# Patient Record
Sex: FEMALE | Race: BLACK OR AFRICAN AMERICAN | NOT HISPANIC OR LATINO | Employment: FULL TIME | URBAN - METROPOLITAN AREA
[De-identification: names, ages, dates, MRNs, and addresses within clinical notes are randomized per-mention and may not be internally consistent; named-entity substitution may affect disease eponyms.]

---

## 2017-06-13 ENCOUNTER — HOSPITAL ENCOUNTER (EMERGENCY)
Facility: HOSPITAL | Age: 37
Discharge: HOME | End: 2017-06-13
Attending: FAMILY MEDICINE
Payer: MEDICAID

## 2017-06-13 VITALS
OXYGEN SATURATION: 100 % | HEART RATE: 63 BPM | TEMPERATURE: 98 F | HEIGHT: 65 IN | RESPIRATION RATE: 17 BRPM | SYSTOLIC BLOOD PRESSURE: 134 MMHG | DIASTOLIC BLOOD PRESSURE: 80 MMHG

## 2017-06-13 DIAGNOSIS — R53.1 WEAKNESS: Primary | ICD-10-CM

## 2017-06-13 DIAGNOSIS — R55 SYNCOPE: ICD-10-CM

## 2017-06-13 DIAGNOSIS — D64.9 ANEMIA, UNSPECIFIED TYPE: ICD-10-CM

## 2017-06-13 DIAGNOSIS — R07.89 CHEST WALL PAIN: ICD-10-CM

## 2017-06-13 DIAGNOSIS — R07.89 LEFT-SIDED CHEST WALL PAIN: ICD-10-CM

## 2017-06-13 LAB
ALBUMIN SERPL BCP-MCNC: 5.3 G/DL
ALP SERPL-CCNC: 71 IU/L
ALT SERPL W/O P-5'-P-CCNC: 26 IU/L
ANION GAP SERPL CALC-SCNC: 17 MMOL/L
ANISOCYTOSIS BLD QL SMEAR: SLIGHT
APTT BLDCRRT: 21.8 SEC
AST SERPL-CCNC: 34 IU/L
B-HCG UR QL: NEGATIVE
BASOPHILS # BLD AUTO: 0.02 K/UL
BASOPHILS NFR BLD: 0.4 %
BILIRUB SERPL-MCNC: 0.9 MG/DL
BILIRUB UR QL STRIP: NEGATIVE
BUN SERPL-MCNC: 10 MG/DL
CALCIUM SERPL-MCNC: 9.4 MG/DL
CHLORIDE SERPL-SCNC: 106 MMOL/L
CLARITY UR REFRACT.AUTO: CLEAR
CO2 SERPL-SCNC: 21 MMOL/L
COLOR UR AUTO: ABNORMAL
CREAT SERPL-MCNC: 0.76 MG/DL
DIFFERENTIAL METHOD: ABNORMAL
EOSINOPHIL # BLD AUTO: 0.1 K/UL
EOSINOPHIL NFR BLD: 1.5 %
ERYTHROCYTE [DISTWIDTH] IN BLOOD BY AUTOMATED COUNT: 18.6 %
EST. GFR  (AFRICAN AMERICAN): >60 ML/MIN/1.73 M^2
EST. GFR  (NON AFRICAN AMERICAN): >60 ML/MIN/1.73 M^2
GLUCOSE SERPL-MCNC: 80 MG/DL
GLUCOSE UR QL STRIP: NEGATIVE
HCT VFR BLD AUTO: 29.4 %
HGB BLD-MCNC: 9 G/DL
HGB UR QL STRIP: ABNORMAL
HYPOCHROMIA BLD QL SMEAR: ABNORMAL
INR PPP: 1.2
KETONES UR QL STRIP: NEGATIVE
LEUKOCYTE ESTERASE UR QL STRIP: NEGATIVE
LYMPHOCYTES # BLD AUTO: 1.6 K/UL
LYMPHOCYTES NFR BLD: 29.8 %
MCH RBC QN AUTO: 21.5 PG
MCHC RBC AUTO-ENTMCNC: 30.6 %
MCV RBC AUTO: 70 FL
MONOCYTES # BLD AUTO: 0.6 K/UL
MONOCYTES NFR BLD: 12.1 %
NEUTROPHILS # BLD AUTO: 2.9 K/UL
NEUTROPHILS NFR BLD: 56.2 %
NITRITE UR QL STRIP: NEGATIVE
PH UR STRIP: 6 [PH] (ref 5–8)
PLATELET # BLD AUTO: 314 K/UL
PMV BLD AUTO: 9.9 FL
POCT GLUCOSE: 100 MG/DL (ref 70–110)
POTASSIUM SERPL-SCNC: 3.4 MMOL/L
PROT SERPL-MCNC: 9.9 G/DL
PROT UR QL STRIP: ABNORMAL
PROTHROMBIN TIME: 13.1 SEC
RBC # BLD AUTO: 4.18 M/UL
SODIUM SERPL-SCNC: 144 MMOL/L
SP GR UR STRIP: 1.02 (ref 1–1.03)
TROPONIN I SERPL DL<=0.01 NG/ML-MCNC: <0.012 NG/ML
URN SPEC COLLECT METH UR: ABNORMAL
UROBILINOGEN UR STRIP-ACNC: 1 EU/DL
WBC # BLD AUTO: 5.21 K/UL

## 2017-06-13 PROCEDURE — 85025 COMPLETE CBC W/AUTO DIFF WBC: CPT

## 2017-06-13 PROCEDURE — 99284 EMERGENCY DEPT VISIT MOD MDM: CPT | Mod: 25

## 2017-06-13 PROCEDURE — 81003 URINALYSIS AUTO W/O SCOPE: CPT

## 2017-06-13 PROCEDURE — 84484 ASSAY OF TROPONIN QUANT: CPT

## 2017-06-13 PROCEDURE — 82962 GLUCOSE BLOOD TEST: CPT

## 2017-06-13 PROCEDURE — 85730 THROMBOPLASTIN TIME PARTIAL: CPT

## 2017-06-13 PROCEDURE — 81025 URINE PREGNANCY TEST: CPT

## 2017-06-13 PROCEDURE — 25000003 PHARM REV CODE 250: Performed by: FAMILY MEDICINE

## 2017-06-13 PROCEDURE — 80053 COMPREHEN METABOLIC PANEL: CPT

## 2017-06-13 PROCEDURE — 93005 ELECTROCARDIOGRAM TRACING: CPT

## 2017-06-13 PROCEDURE — 85610 PROTHROMBIN TIME: CPT

## 2017-06-13 RX ORDER — KETOROLAC TROMETHAMINE 10 MG/1
10 TABLET, FILM COATED ORAL
Status: COMPLETED | OUTPATIENT
Start: 2017-06-13 | End: 2017-06-13

## 2017-06-13 RX ORDER — IBUPROFEN 800 MG/1
800 TABLET ORAL 3 TIMES DAILY PRN
Qty: 30 TABLET | Refills: 0 | Status: SHIPPED | OUTPATIENT
Start: 2017-06-13 | End: 2018-01-26

## 2017-06-13 RX ADMIN — KETOROLAC TROMETHAMINE 10 MG: 10 TABLET, FILM COATED ORAL at 02:06

## 2017-06-13 NOTE — ED TRIAGE NOTES
PT reports she passed out at work a few minutes ago. Pt reports she did not fall on ground bc employees caught her. Pt also reports SOB and chest pain that started last night

## 2017-06-13 NOTE — ED PROVIDER NOTES
Encounter Date: 2017       History     Chief Complaint   Patient presents with    Loss of Consciousness     PT reports she passed out at work a few minutes ago. Pt reports she did not fall on ground bc employees caught her. Pt also reports SOB and chest pain that started last night     Review of patient's allergies indicates:  No Known Allergies  Patient says she felt to see and suddenly was about to fall down when her coworker helped her not to fall.  Since then patient is having some dizziness.  Patient also complains of left chest wall pain.  No shortness of breath no cough no fever.  No abdominal pain.      The history is provided by the patient.     Past Medical History:   Diagnosis Date    Hypertension      Past Surgical History:   Procedure Laterality Date     SECTION      x 3    TONSILLECTOMY       History reviewed. No pertinent family history.  Social History   Substance Use Topics    Smoking status: Current Every Day Smoker     Packs/day: 0.50     Types: Cigarettes    Smokeless tobacco: Not on file    Alcohol use Yes      Comment: rarely     Review of Systems   Constitutional: Negative for activity change, appetite change, chills, diaphoresis, fatigue and fever.   HENT: Negative for congestion, ear pain, sore throat and tinnitus.    Respiratory: Negative for cough, shortness of breath and wheezing.    Gastrointestinal: Negative for abdominal pain, diarrhea, nausea and vomiting.   Genitourinary: Negative for difficulty urinating, dysuria and frequency.   Musculoskeletal: Negative for back pain and neck pain.   Skin: Negative for rash.   Neurological: Positive for dizziness and syncope. Negative for tremors, seizures, facial asymmetry, speech difficulty, weakness, light-headedness, numbness and headaches.   All other systems reviewed and are negative.      Physical Exam     Initial Vitals [17 1210]   BP Pulse Resp Temp SpO2   133/77 87 20 98.5 °F (36.9 °C) 99 %     Physical  Exam    Nursing note and vitals reviewed.  Constitutional: She appears well-developed and well-nourished.   HENT:   Head: Normocephalic and atraumatic.   Right Ear: External ear normal.   Left Ear: External ear normal.   Nose: Nose normal.   Mouth/Throat: Oropharynx is clear and moist.   Eyes: Conjunctivae and EOM are normal. Pupils are equal, round, and reactive to light.   Neck: Normal range of motion. Neck supple.   Cardiovascular: Normal rate, regular rhythm, normal heart sounds and intact distal pulses. Exam reveals no gallop and no friction rub.    No murmur heard.  Pulmonary/Chest: Breath sounds normal. No respiratory distress. She has no wheezes. She has no rales. She exhibits no tenderness.   Abdominal: Soft. Bowel sounds are normal. She exhibits no distension and no mass. There is no tenderness. There is no guarding.   Musculoskeletal: Normal range of motion.   Neurological: She is alert and oriented to person, place, and time. She has normal strength and normal reflexes.   Skin: Skin is warm. Capillary refill takes less than 2 seconds.   Psychiatric: She has a normal mood and affect. Thought content normal.         ED Course   Procedures  Labs Reviewed   URINALYSIS - Abnormal; Notable for the following:        Result Value    Protein, UA Trace (*)     Occult Blood UA Trace (*)     All other components within normal limits   COMPREHENSIVE METABOLIC PANEL - Abnormal; Notable for the following:     Potassium 3.4 (*)     CO2 21 (*)     Total Protein 9.9 (*)     Albumin 5.3 (*)     Anion Gap 17 (*)     All other components within normal limits   PROTIME-INR - Abnormal; Notable for the following:     Prothrombin Time 13.1 (*)     All other components within normal limits   CBC W/ AUTO DIFFERENTIAL - Abnormal; Notable for the following:     Hemoglobin 9.0 (*)     Hematocrit 29.4 (*)     MCV 70 (*)     MCH 21.5 (*)     MCHC 30.6 (*)     RDW 18.6 (*)     All other components within normal limits   PREGNANCY TEST,  URINE RAPID   APTT   TROPONIN I   POCT GLUCOSE        ECG Results          EKG 12-lead (Final result)  Result time 06/13/17 14:26:32    Final result by Interface, Lab In [facility] (06/13/17 14:26:32)                 Narrative:    Test Reason : R55  Blood Pressure :  mmHG  Vent. Rate : 081 BPM     Atrial Rate : 081 BPM     P-R Int : 144 ms          QRS Dur : 092 ms      QT Int : 380 ms       P-R-T Axes : 078 073 067 degrees     QTc Int : 441 ms    Normal sinus rhythm  Normal ECG    Confirmed by Perri Navas MD ([id number]) on 6/13/2017 2:26:23 PM    Referred By: KAYLA   SELF           Confirmed By:Perri Navas MD                                                 ED Course     Clinical Impression:   The primary encounter diagnosis was Weakness. Diagnoses of Syncope, Left-sided chest wall pain, Chest wall pain, and Anemia, unspecified type were also pertinent to this visit.    Disposition:   Disposition: Discharged  Condition: Stable       Rj Rodriguez MD  06/19/17 0424

## 2017-06-28 ENCOUNTER — LAB VISIT (OUTPATIENT)
Dept: LAB | Facility: HOSPITAL | Age: 37
End: 2017-06-28
Attending: NURSE PRACTITIONER
Payer: MEDICAID

## 2017-06-28 DIAGNOSIS — E55.9 UNSPECIFIED VITAMIN D DEFICIENCY: ICD-10-CM

## 2017-06-28 DIAGNOSIS — R63.4 WEIGHT LOSS: ICD-10-CM

## 2017-06-28 DIAGNOSIS — D64.9 ANEMIA, UNSPECIFIED: Primary | ICD-10-CM

## 2017-06-28 LAB
25(OH)D3+25(OH)D2 SERPL-MCNC: 13 NG/ML
ERYTHROCYTE [SEDIMENTATION RATE] IN BLOOD BY WESTERGREN METHOD: 10 MM/HR
FERRITIN SERPL-MCNC: 5 NG/ML
FOLATE SERPL-MCNC: 9.8 NG/ML
IRON SERPL-MCNC: 14 UG/DL
RETICS/RBC NFR AUTO: 0.8 %
SATURATED IRON: 3 %
T3FREE SERPL-MCNC: 2.6 PG/ML
T4 SERPL-MCNC: 6.3 UG/DL
TOTAL IRON BINDING CAPACITY: 478 UG/DL
TRANSFERRIN SERPL-MCNC: 323 MG/DL
TSH SERPL DL<=0.005 MIU/L-ACNC: 1.08 UIU/ML
VIT B12 SERPL-MCNC: 226 PG/ML

## 2017-06-28 PROCEDURE — 36415 COLL VENOUS BLD VENIPUNCTURE: CPT | Mod: PO

## 2017-06-28 PROCEDURE — 85045 AUTOMATED RETICULOCYTE COUNT: CPT | Mod: PO

## 2017-06-28 PROCEDURE — 84436 ASSAY OF TOTAL THYROXINE: CPT

## 2017-06-28 PROCEDURE — 83540 ASSAY OF IRON: CPT | Mod: PO

## 2017-06-28 PROCEDURE — 86703 HIV-1/HIV-2 1 RESULT ANTBDY: CPT | Mod: PO

## 2017-06-28 PROCEDURE — 82746 ASSAY OF FOLIC ACID SERUM: CPT | Mod: PO

## 2017-06-28 PROCEDURE — 84443 ASSAY THYROID STIM HORMONE: CPT

## 2017-06-28 PROCEDURE — 85652 RBC SED RATE AUTOMATED: CPT

## 2017-06-28 PROCEDURE — 82607 VITAMIN B-12: CPT | Mod: PO

## 2017-06-28 PROCEDURE — 82728 ASSAY OF FERRITIN: CPT

## 2017-06-28 PROCEDURE — 82306 VITAMIN D 25 HYDROXY: CPT | Mod: PO

## 2017-06-28 PROCEDURE — 84481 FREE ASSAY (FT-3): CPT | Mod: PO

## 2017-06-29 LAB — HIV 1+2 AB+HIV1 P24 AG SERPL QL IA: NEGATIVE

## 2017-11-30 ENCOUNTER — HOSPITAL ENCOUNTER (EMERGENCY)
Facility: HOSPITAL | Age: 37
Discharge: HOME | End: 2017-11-30
Payer: MEDICAID

## 2017-11-30 VITALS
HEART RATE: 76 BPM | BODY MASS INDEX: 22.49 KG/M2 | DIASTOLIC BLOOD PRESSURE: 81 MMHG | TEMPERATURE: 99 F | RESPIRATION RATE: 18 BRPM | HEIGHT: 65 IN | SYSTOLIC BLOOD PRESSURE: 143 MMHG | OXYGEN SATURATION: 99 % | WEIGHT: 135 LBS

## 2017-11-30 DIAGNOSIS — R07.9 CHEST PAIN, UNSPECIFIED TYPE: Primary | ICD-10-CM

## 2017-11-30 DIAGNOSIS — R07.89 CHEST WALL PAIN: ICD-10-CM

## 2017-11-30 DIAGNOSIS — R07.9 CHEST PAIN: ICD-10-CM

## 2017-11-30 LAB
ALBUMIN SERPL BCP-MCNC: 4.1 G/DL
ALP SERPL-CCNC: 49 U/L
ALT SERPL W/O P-5'-P-CCNC: 27 U/L
ANION GAP SERPL CALC-SCNC: 9 MMOL/L
AST SERPL-CCNC: 24 U/L
BASOPHILS # BLD AUTO: 0.02 K/UL
BASOPHILS NFR BLD: 0.4 %
BILIRUB SERPL-MCNC: 0.4 MG/DL
BUN SERPL-MCNC: 7 MG/DL
CALCIUM SERPL-MCNC: 8.4 MG/DL
CHLORIDE SERPL-SCNC: 105 MMOL/L
CO2 SERPL-SCNC: 26 MMOL/L
CREAT SERPL-MCNC: 0.73 MG/DL
DIFFERENTIAL METHOD: ABNORMAL
EOSINOPHIL # BLD AUTO: 0.1 K/UL
EOSINOPHIL NFR BLD: 2.1 %
ERYTHROCYTE [DISTWIDTH] IN BLOOD BY AUTOMATED COUNT: 19 %
EST. GFR  (AFRICAN AMERICAN): >60 ML/MIN/1.73 M^2
EST. GFR  (NON AFRICAN AMERICAN): >60 ML/MIN/1.73 M^2
GLUCOSE SERPL-MCNC: 88 MG/DL
HCT VFR BLD AUTO: 31.8 %
HGB BLD-MCNC: 9.8 G/DL
LYMPHOCYTES # BLD AUTO: 1.3 K/UL
LYMPHOCYTES NFR BLD: 23.8 %
MCH RBC QN AUTO: 25.1 PG
MCHC RBC AUTO-ENTMCNC: 30.8 G/DL
MCV RBC AUTO: 82 FL
MONOCYTES # BLD AUTO: 0.6 K/UL
MONOCYTES NFR BLD: 10.5 %
NEUTROPHILS # BLD AUTO: 3.3 K/UL
NEUTROPHILS NFR BLD: 63.2 %
NT-PROBNP: 203 PG/ML
PLATELET # BLD AUTO: 277 K/UL
PMV BLD AUTO: 10.4 FL
POTASSIUM SERPL-SCNC: 3.3 MMOL/L
PROT SERPL-MCNC: 7.2 G/DL
RBC # BLD AUTO: 3.9 M/UL
SODIUM SERPL-SCNC: 140 MMOL/L
TROPONIN I SERPL DL<=0.01 NG/ML-MCNC: <0.012 NG/ML
TROPONIN I SERPL DL<=0.01 NG/ML-MCNC: <0.012 NG/ML
WBC # BLD AUTO: 5.25 K/UL

## 2017-11-30 PROCEDURE — 83880 ASSAY OF NATRIURETIC PEPTIDE: CPT

## 2017-11-30 PROCEDURE — 25000003 PHARM REV CODE 250: Performed by: NURSE PRACTITIONER

## 2017-11-30 PROCEDURE — 99284 EMERGENCY DEPT VISIT MOD MDM: CPT | Mod: 25

## 2017-11-30 PROCEDURE — 93005 ELECTROCARDIOGRAM TRACING: CPT

## 2017-11-30 PROCEDURE — 63600175 PHARM REV CODE 636 W HCPCS

## 2017-11-30 PROCEDURE — 96374 THER/PROPH/DIAG INJ IV PUSH: CPT

## 2017-11-30 PROCEDURE — 85025 COMPLETE CBC W/AUTO DIFF WBC: CPT

## 2017-11-30 PROCEDURE — 96372 THER/PROPH/DIAG INJ SC/IM: CPT

## 2017-11-30 PROCEDURE — 84484 ASSAY OF TROPONIN QUANT: CPT

## 2017-11-30 PROCEDURE — 93010 ELECTROCARDIOGRAM REPORT: CPT | Mod: ,,, | Performed by: INTERNAL MEDICINE

## 2017-11-30 PROCEDURE — 80053 COMPREHEN METABOLIC PANEL: CPT

## 2017-11-30 PROCEDURE — 63600175 PHARM REV CODE 636 W HCPCS: Performed by: NURSE PRACTITIONER

## 2017-11-30 RX ORDER — CYCLOBENZAPRINE HCL 10 MG
10 TABLET ORAL 3 TIMES DAILY PRN
Qty: 15 TABLET | Refills: 0 | Status: SHIPPED | OUTPATIENT
Start: 2017-11-30 | End: 2017-12-05

## 2017-11-30 RX ORDER — NAPROXEN SODIUM 550 MG/1
550 TABLET ORAL 2 TIMES DAILY WITH MEALS
Qty: 20 TABLET | Refills: 0 | Status: SHIPPED | OUTPATIENT
Start: 2017-11-30 | End: 2018-04-02

## 2017-11-30 RX ORDER — ORPHENADRINE CITRATE 30 MG/ML
60 INJECTION INTRAMUSCULAR; INTRAVENOUS
Status: COMPLETED | OUTPATIENT
Start: 2017-11-30 | End: 2017-11-30

## 2017-11-30 RX ORDER — LOSARTAN POTASSIUM 100 MG/1
100 TABLET ORAL DAILY
COMMUNITY
End: 2020-08-10 | Stop reason: CLARIF

## 2017-11-30 RX ORDER — MORPHINE SULFATE 10 MG/ML
INJECTION INTRAMUSCULAR; INTRAVENOUS; SUBCUTANEOUS
Status: COMPLETED
Start: 2017-11-30 | End: 2017-11-30

## 2017-11-30 RX ORDER — ASPIRIN 325 MG
325 TABLET ORAL
Status: COMPLETED | OUTPATIENT
Start: 2017-11-30 | End: 2017-11-30

## 2017-11-30 RX ORDER — MORPHINE SULFATE 2 MG/ML
4 INJECTION, SOLUTION INTRAMUSCULAR; INTRAVENOUS
Status: COMPLETED | OUTPATIENT
Start: 2017-11-30 | End: 2017-11-30

## 2017-11-30 RX ADMIN — MORPHINE SULFATE 4 MG: 10 INJECTION INTRAVENOUS at 07:11

## 2017-11-30 RX ADMIN — ORPHENADRINE CITRATE 60 MG: 30 INJECTION INTRAMUSCULAR; INTRAVENOUS at 08:11

## 2017-11-30 RX ADMIN — ASPIRIN 325 MG ORAL TABLET 325 MG: 325 PILL ORAL at 05:11

## 2017-11-30 NOTE — ED PROVIDER NOTES
Encounter Date: 2017       History     Chief Complaint   Patient presents with    Chest Pain     onset yest evening.  pain worse with breathing     37-year-old female presents to emergency room complaining of chest pain ×2 days.  Reports a radiating pain on the left side of the chest that is reproducible with palpation.  Denies any injury or heavy lifting.  States mother gave.  Aspirin yesterday.  Denies any nausea vomiting.  Denies any shortness of breath.          Review of patient's allergies indicates:  No Known Allergies  Past Medical History:   Diagnosis Date    Hypertension      Past Surgical History:   Procedure Laterality Date     SECTION      x 3    TONSILLECTOMY       No family history on file.  Social History   Substance Use Topics    Smoking status: Current Every Day Smoker     Packs/day: 0.50     Types: Cigarettes    Smokeless tobacco: Not on file    Alcohol use Yes      Comment: rarely     Review of Systems   Constitutional: Negative for fever.   HENT: Negative for sore throat.    Respiratory: Positive for chest tightness. Negative for cough and shortness of breath.    Cardiovascular: Positive for chest pain. Negative for palpitations and leg swelling.   Gastrointestinal: Negative for nausea.   Genitourinary: Negative for dysuria.   Musculoskeletal: Negative for back pain.   Skin: Negative for rash.   Neurological: Negative for weakness.   Hematological: Does not bruise/bleed easily.   All other systems reviewed and are negative.      Physical Exam     Initial Vitals [17 1709]   BP Pulse Resp Temp SpO2   (!) 142/80 71 18 97.9 °F (36.6 °C) 99 %      MAP       100.67         Physical Exam    Nursing note and vitals reviewed.  Constitutional: She appears well-developed and well-nourished. No distress.   HENT:   Head: Normocephalic.   Eyes: Conjunctivae are normal.   Neck: Normal range of motion. Neck supple.   Cardiovascular: Normal rate and regular rhythm.   Pulmonary/Chest:  Breath sounds normal. No respiratory distress. She has no wheezes. She exhibits tenderness.   Abdominal: Soft. Bowel sounds are normal. She exhibits no distension and no abdominal bruit. There is no tenderness. No hernia.   Neurological: She is alert and oriented to person, place, and time.   Skin: Skin is warm and dry.   Psychiatric: She has a normal mood and affect. Her behavior is normal. Judgment and thought content normal.         ED Course   Procedures  Labs Reviewed   CBC W/ AUTO DIFFERENTIAL - Abnormal; Notable for the following:        Result Value    RBC 3.90 (*)     Hemoglobin 9.8 (*)     Hematocrit 31.8 (*)     MCH 25.1 (*)     MCHC 30.8 (*)     RDW 19.0 (*)     All other components within normal limits   COMPREHENSIVE METABOLIC PANEL - Abnormal; Notable for the following:     Potassium 3.3 (*)     Calcium 8.4 (*)     All other components within normal limits   TROPONIN I   NT-PRO NATRIURETIC PEPTIDE     EKG Readings: (Independently Interpreted)   Rhythm: Normal Sinus Rhythm. Heart Rate: 68. Ectopy: No Ectopy. Conduction: Normal. ST Segments: Non-Specific ST Segment Depression. Clinical Impression: Normal Sinus Rhythm Other Impression: ST and T wave abnormality     Imaging Results          X-Ray Chest PA And Lateral (Final result)  Result time 11/30/17 17:37:36    Final result by Davey Encarnacion MD (11/30/17 17:37:36)                 Impression:         Unremarkable exam. No significant change from prior study dated 06/13/2017      Electronically signed by: DAVEY ENCARNACION MD  Date:     11/30/17  Time:    17:37              Narrative:    Exam: Chest X-ray, two views.    History: Chest pain    Findings: No infiltrate or effusion identified. Cardiomediastinal silhouette is within normal limits. Skeletal structures are unremarkable.                                 Medical Decision Making:   Initial Assessment:   37-year-old female presents to emergency room complaining of chest pain ×2 days.  Reports a  radiating pain on the left side of the chest that is reproducible with palpation.  Denies any injury or heavy lifting.  States mother gave.  Aspirin yesterday.  Denies any nausea vomiting.  Denies any shortness of breath.  Differential Diagnosis:   MI, angina, pneumonia, CHF, muscle strain, rib fracture  Clinical Tests:   Lab Tests: Ordered and Reviewed  Radiological Study: Ordered and Reviewed  Medical Tests: Ordered and Reviewed  ED Management:  Labs unremarkable.  EKG reveals some nonspecific ST and T-wave abnormalities.  Repeat troponin was also negative.  Patient reports some relief of medications.  I will treat the patient with medications for pain however I instructed the patient to follow-up with cardiologist for further examination of the heart.  Pain is somewhat reproducible with palpation so this may likely be due to muscle or chest wall pain.  Patient also mentioned diarrhea for the past 3 days.  However she will be following up with her primary care doctor on tomorrow.  Labs do not reveal dehydration.  Patient denies any blood in the stool.  I instructed the patient to hydrate well.                   ED Course      Clinical Impression:   The primary encounter diagnosis was Chest pain, unspecified type. Diagnoses of Chest pain and Chest wall pain were also pertinent to this visit.                           Lavern Cohen NP  12/02/17 [id number]       Abram Kraus MD  12/12/17 [id number]

## 2017-12-01 NOTE — ED NOTES
"Pt AAOX3 skin warm and dry. Pt c/o pain 10 of 10 to left arm axillary to left chest area. Pt states " it feels like my heart is being squeezed and my finger tips are numb" pt states pain is constant and increased with deep breathing inspiration. Pt denies cough. Pt reports pain started at work yesterday. Pt medicated with morphine 4 mg IV per orders. Pt states that her niece is on her way to drive pt home.Lungs CTA heart sounds S1S2 regular rate.  "

## 2017-12-01 NOTE — DISCHARGE INSTRUCTIONS
Follow-up with your primary care doctor for cardiology appointment.  Take medications as prescribed.

## 2017-12-01 NOTE — ED NOTES
Pt states that she has had diarhea for 3 days. Approx. 6x a day. Pt c/o headache. OPAL Rodriguez notified

## 2017-12-15 ENCOUNTER — LAB VISIT (OUTPATIENT)
Dept: LAB | Facility: HOSPITAL | Age: 37
End: 2017-12-15
Attending: NURSE PRACTITIONER
Payer: MEDICAID

## 2017-12-15 DIAGNOSIS — D50.0 IRON DEFICIENCY ANEMIA DUE TO CHRONIC BLOOD LOSS: Primary | ICD-10-CM

## 2017-12-15 LAB
25(OH)D3+25(OH)D2 SERPL-MCNC: 18 NG/ML
FERRITIN SERPL-MCNC: 13 NG/ML
FOLATE SERPL-MCNC: 3.2 NG/ML
IRON SERPL-MCNC: 41 UG/DL
IRON SERPL-MCNC: 41 UG/DL
RETICS/RBC NFR AUTO: 0.6 %
SATURATED IRON: 9 %
TOTAL IRON BINDING CAPACITY: 441 UG/DL
TRANSFERRIN SERPL-MCNC: 298 MG/DL

## 2017-12-15 PROCEDURE — 82728 ASSAY OF FERRITIN: CPT

## 2017-12-15 PROCEDURE — 83540 ASSAY OF IRON: CPT | Mod: PO

## 2017-12-15 PROCEDURE — 36415 COLL VENOUS BLD VENIPUNCTURE: CPT | Mod: PO

## 2017-12-15 PROCEDURE — 85045 AUTOMATED RETICULOCYTE COUNT: CPT | Mod: PO

## 2017-12-15 PROCEDURE — 82306 VITAMIN D 25 HYDROXY: CPT | Mod: PO

## 2017-12-15 PROCEDURE — 82746 ASSAY OF FOLIC ACID SERUM: CPT | Mod: PO

## 2017-12-18 DIAGNOSIS — N64.4 MASTODYNIA: Primary | ICD-10-CM

## 2017-12-28 ENCOUNTER — HOSPITAL ENCOUNTER (OUTPATIENT)
Dept: RADIOLOGY | Facility: HOSPITAL | Age: 37
Discharge: HOME | End: 2017-12-28
Attending: NURSE PRACTITIONER
Payer: MEDICAID

## 2017-12-28 DIAGNOSIS — N64.4 MASTODYNIA: ICD-10-CM

## 2017-12-28 PROCEDURE — 77067 SCR MAMMO BI INCL CAD: CPT | Mod: TC,PO

## 2018-01-26 ENCOUNTER — HOSPITAL ENCOUNTER (EMERGENCY)
Facility: HOSPITAL | Age: 38
Discharge: HOME | End: 2018-01-26
Attending: EMERGENCY MEDICINE
Payer: MEDICAID

## 2018-01-26 VITALS
HEIGHT: 65 IN | WEIGHT: 145 LBS | RESPIRATION RATE: 17 BRPM | TEMPERATURE: 99 F | OXYGEN SATURATION: 99 % | DIASTOLIC BLOOD PRESSURE: 68 MMHG | HEART RATE: 83 BPM | BODY MASS INDEX: 24.16 KG/M2 | SYSTOLIC BLOOD PRESSURE: 132 MMHG

## 2018-01-26 DIAGNOSIS — R05.9 COUGH: ICD-10-CM

## 2018-01-26 DIAGNOSIS — R68.89 FLU-LIKE SYMPTOMS: Primary | ICD-10-CM

## 2018-01-26 LAB
ALBUMIN SERPL BCP-MCNC: 4.3 G/DL
ALP SERPL-CCNC: 71 U/L
ALT SERPL W/O P-5'-P-CCNC: 32 U/L
ANION GAP SERPL CALC-SCNC: 11 MMOL/L
AST SERPL-CCNC: 24 U/L
BASOPHILS # BLD AUTO: 0.01 K/UL
BASOPHILS NFR BLD: 0.2 %
BILIRUB SERPL-MCNC: 0.4 MG/DL
BUN SERPL-MCNC: 9 MG/DL
CALCIUM SERPL-MCNC: 8.5 MG/DL
CHLORIDE SERPL-SCNC: 105 MMOL/L
CO2 SERPL-SCNC: 27 MMOL/L
CREAT SERPL-MCNC: 0.64 MG/DL
DEPRECATED S PYO AG THROAT QL EIA: NEGATIVE
DIFFERENTIAL METHOD: ABNORMAL
EOSINOPHIL # BLD AUTO: 0 K/UL
EOSINOPHIL NFR BLD: 0.5 %
ERYTHROCYTE [DISTWIDTH] IN BLOOD BY AUTOMATED COUNT: 18.7 %
EST. GFR  (AFRICAN AMERICAN): >60 ML/MIN/1.73 M^2
EST. GFR  (NON AFRICAN AMERICAN): >60 ML/MIN/1.73 M^2
FLUAV AG SPEC QL IA: NEGATIVE
FLUBV AG SPEC QL IA: NEGATIVE
GLUCOSE SERPL-MCNC: 98 MG/DL
HCT VFR BLD AUTO: 36 %
HGB BLD-MCNC: 11.2 G/DL
LYMPHOCYTES # BLD AUTO: 0.9 K/UL
LYMPHOCYTES NFR BLD: 13.7 %
MCH RBC QN AUTO: 24.9 PG
MCHC RBC AUTO-ENTMCNC: 31.1 G/DL
MCV RBC AUTO: 80 FL
MONOCYTES # BLD AUTO: 0.7 K/UL
MONOCYTES NFR BLD: 10.7 %
NEUTROPHILS # BLD AUTO: 4.9 K/UL
NEUTROPHILS NFR BLD: 74.7 %
PLATELET # BLD AUTO: 207 K/UL
PMV BLD AUTO: 10.1 FL
POTASSIUM SERPL-SCNC: 3.3 MMOL/L
PROT SERPL-MCNC: 8.2 G/DL
RBC # BLD AUTO: 4.49 M/UL
SODIUM SERPL-SCNC: 143 MMOL/L
SPECIMEN SOURCE: NORMAL
WBC # BLD AUTO: 6.52 K/UL

## 2018-01-26 PROCEDURE — 96360 HYDRATION IV INFUSION INIT: CPT

## 2018-01-26 PROCEDURE — 25000242 PHARM REV CODE 250 ALT 637 W/ HCPCS: Performed by: EMERGENCY MEDICINE

## 2018-01-26 PROCEDURE — 94640 AIRWAY INHALATION TREATMENT: CPT

## 2018-01-26 PROCEDURE — 85025 COMPLETE CBC W/AUTO DIFF WBC: CPT

## 2018-01-26 PROCEDURE — 94760 N-INVAS EAR/PLS OXIMETRY 1: CPT

## 2018-01-26 PROCEDURE — 80053 COMPREHEN METABOLIC PANEL: CPT

## 2018-01-26 PROCEDURE — 99284 EMERGENCY DEPT VISIT MOD MDM: CPT | Mod: 25

## 2018-01-26 PROCEDURE — 87880 STREP A ASSAY W/OPTIC: CPT

## 2018-01-26 PROCEDURE — 87081 CULTURE SCREEN ONLY: CPT

## 2018-01-26 PROCEDURE — 25000003 PHARM REV CODE 250: Performed by: EMERGENCY MEDICINE

## 2018-01-26 PROCEDURE — 87400 INFLUENZA A/B EACH AG IA: CPT

## 2018-01-26 PROCEDURE — 96361 HYDRATE IV INFUSION ADD-ON: CPT

## 2018-01-26 RX ORDER — ALBUTEROL SULFATE 90 UG/1
1-2 AEROSOL, METERED RESPIRATORY (INHALATION) EVERY 6 HOURS PRN
Qty: 1 INHALER | Refills: 0 | OUTPATIENT
Start: 2018-01-26 | End: 2019-03-11

## 2018-01-26 RX ORDER — PROMETHAZINE HYDROCHLORIDE AND CODEINE PHOSPHATE 6.25; 1 MG/5ML; MG/5ML
5 SOLUTION ORAL EVERY 4 HOURS PRN
Qty: 437 ML | Refills: 1 | Status: SHIPPED | OUTPATIENT
Start: 2018-01-26 | End: 2018-02-05

## 2018-01-26 RX ORDER — IPRATROPIUM BROMIDE AND ALBUTEROL SULFATE 2.5; .5 MG/3ML; MG/3ML
3 SOLUTION RESPIRATORY (INHALATION)
Status: COMPLETED | OUTPATIENT
Start: 2018-01-26 | End: 2018-01-26

## 2018-01-26 RX ORDER — CETIRIZINE HYDROCHLORIDE 10 MG/1
10 TABLET ORAL DAILY
Qty: 30 TABLET | Refills: 0 | OUTPATIENT
Start: 2018-01-26 | End: 2019-03-11

## 2018-01-26 RX ORDER — IBUPROFEN 800 MG/1
800 TABLET ORAL 3 TIMES DAILY PRN
Qty: 30 TABLET | Refills: 0 | Status: SHIPPED | OUTPATIENT
Start: 2018-01-26 | End: 2019-08-31

## 2018-01-26 RX ORDER — ONDANSETRON 4 MG/1
4 TABLET, FILM COATED ORAL EVERY 6 HOURS
Qty: 12 TABLET | Refills: 0 | Status: SHIPPED | OUTPATIENT
Start: 2018-01-26 | End: 2018-10-08

## 2018-01-26 RX ADMIN — IPRATROPIUM BROMIDE AND ALBUTEROL SULFATE 3 ML: .5; 3 SOLUTION RESPIRATORY (INHALATION) at 08:01

## 2018-01-26 RX ADMIN — SODIUM CHLORIDE 1000 ML: 0.9 INJECTION, SOLUTION INTRAVENOUS at 08:01

## 2018-01-26 NOTE — ED PROVIDER NOTES
Encounter Date: 2018       History     Chief Complaint   Patient presents with    Influenza     38-year-old female with a smoking history of only 2 years, started at age 36, presents to the emergency department with cough, body aches, nausea, and vomiting.  She has a hoarse voice from coughing.  Cough is productive of yellowish-white mucous.      The history is provided by the patient.   URI   The primary symptoms include fatigue, cough, nausea and vomiting. The current episode started several days ago. This is a new problem. The problem has not changed since onset.  The cough began 3 to 5 days ago. The cough is productive. The sputum is yellow and white.   Symptoms associated with the illness include chills. The following treatments were addressed: Acetaminophen was not tried. A decongestant was not tried. Aspirin was not tried. NSAIDs were not tried.     Review of patient's allergies indicates:  No Known Allergies  Past Medical History:   Diagnosis Date    Hypertension      Past Surgical History:   Procedure Laterality Date     SECTION      x 3    TONSILLECTOMY       History reviewed. No pertinent family history.  Social History   Substance Use Topics    Smoking status: Current Every Day Smoker     Packs/day: 0.50     Types: Cigarettes    Smokeless tobacco: Not on file    Alcohol use Yes      Comment: rarely     Review of Systems   Constitutional: Positive for appetite change, chills and fatigue.   Respiratory: Positive for cough.    Gastrointestinal: Positive for nausea and vomiting.   All other systems reviewed and are negative.      Physical Exam     Initial Vitals [18 0806]   BP Pulse Resp Temp SpO2   122/84 109 20 98.8 °F (37.1 °C) 97 %      MAP       96.67         Physical Exam    Nursing note and vitals reviewed.  Constitutional: She appears well-developed and well-nourished. She appears distressed.   She looks tired   HENT:   Head: Normocephalic and atraumatic.   Right Ear:  External ear normal.   Left Ear: External ear normal.   Mouth/Throat: No oropharyngeal exudate.   Eyes: EOM are normal. Pupils are equal, round, and reactive to light.   Neck: Normal range of motion. Neck supple.   Cardiovascular: Normal rate.   Pulmonary/Chest: No respiratory distress.   Wheezing in her left lower lobe   Abdominal: Soft.   Musculoskeletal: Normal range of motion.   Neurological: She is alert and oriented to person, place, and time. She has normal strength. No cranial nerve deficit.   Skin: Skin is warm and dry.   Psychiatric: She has a normal mood and affect. Her behavior is normal. Thought content normal.         ED Course   Procedures  Labs Reviewed   CBC W/ AUTO DIFFERENTIAL - Abnormal; Notable for the following:        Result Value    Hemoglobin 11.2 (*)     Hematocrit 36.0 (*)     MCV 80 (*)     MCH 24.9 (*)     MCHC 31.1 (*)     RDW 18.7 (*)     Lymph # 0.9 (*)     Gran% 74.7 (*)     Lymph% 13.7 (*)     All other components within normal limits   COMPREHENSIVE METABOLIC PANEL - Abnormal; Notable for the following:     Potassium 3.3 (*)     Calcium 8.5 (*)     All other components within normal limits   THROAT SCREEN, RAPID   CULTURE, STREP A,  THROAT   INFLUENZA A AND B ANTIGEN             Medical Decision Making:   Initial Assessment:   38-year-old female otherwise healthy, with a 2-pack-year smoking history here with body aches fatigue cough, nausea, 1 episode of posttussive emesis  Differential Diagnosis:    viral syndrome, pneumonia, dehydration, hepatitis, bronchitis  Clinical Tests:   Lab Tests: Ordered and Reviewed  Radiological Study: Ordered and Reviewed  ED Management:  Patient was given analgesics, and supportive care.  Workup here was unremarkable.  She had normal vital signs, was given a sheet for primary care doctor establishment, as well as supportive measures and reasons to return. Patient was counseled on reasons to return to the ED and expressed understanding, patient was  discharged in stable condition with appropriate outpatient follow up plan.                      ED Course as of Jan 26 0935 Fri Jan 26, 2018   0848 No evidence of pneumonia, flu swab negative and she is out of the treatment period for tamiflu  [MG]   0929 CMP notable only for slight hypoKalemia, will dc home with supportive measures  [MG]      ED Course User Index  [MG] Suellen Parsons MD     Clinical Impression:   The primary encounter diagnosis was Flu-like symptoms. A diagnosis of Cough was also pertinent to this visit.                           Suellen Parsons MD  01/26/18 0963

## 2018-01-28 LAB — BACTERIA THROAT CULT: NORMAL

## 2018-04-02 ENCOUNTER — HOSPITAL ENCOUNTER (EMERGENCY)
Facility: HOSPITAL | Age: 38
Discharge: HOME | End: 2018-04-02
Attending: EMERGENCY MEDICINE
Payer: MEDICAID

## 2018-04-02 VITALS
WEIGHT: 140 LBS | HEART RATE: 75 BPM | TEMPERATURE: 99 F | RESPIRATION RATE: 18 BRPM | DIASTOLIC BLOOD PRESSURE: 84 MMHG | BODY MASS INDEX: 23.3 KG/M2 | SYSTOLIC BLOOD PRESSURE: 147 MMHG | OXYGEN SATURATION: 96 %

## 2018-04-02 DIAGNOSIS — S39.012A BACK STRAIN, INITIAL ENCOUNTER: Primary | ICD-10-CM

## 2018-04-02 PROCEDURE — 99283 EMERGENCY DEPT VISIT LOW MDM: CPT | Mod: 25

## 2018-04-02 PROCEDURE — 96372 THER/PROPH/DIAG INJ SC/IM: CPT

## 2018-04-02 PROCEDURE — 63600175 PHARM REV CODE 636 W HCPCS: Performed by: NURSE PRACTITIONER

## 2018-04-02 RX ORDER — METHOCARBAMOL 500 MG/1
1000 TABLET, FILM COATED ORAL 2 TIMES DAILY PRN
Qty: 15 TABLET | Refills: 0 | Status: SHIPPED | OUTPATIENT
Start: 2018-04-02 | End: 2018-04-07

## 2018-04-02 RX ORDER — NAPROXEN SODIUM 550 MG/1
550 TABLET ORAL 2 TIMES DAILY WITH MEALS
Qty: 20 TABLET | Refills: 0 | Status: SHIPPED | OUTPATIENT
Start: 2018-04-02 | End: 2018-10-08

## 2018-04-02 RX ORDER — DEXAMETHASONE SODIUM PHOSPHATE 4 MG/ML
8 INJECTION, SOLUTION INTRA-ARTICULAR; INTRALESIONAL; INTRAMUSCULAR; INTRAVENOUS; SOFT TISSUE
Status: COMPLETED | OUTPATIENT
Start: 2018-04-02 | End: 2018-04-02

## 2018-04-02 RX ADMIN — DEXAMETHASONE SODIUM PHOSPHATE 8 MG: 4 INJECTION, SOLUTION INTRAMUSCULAR; INTRAVENOUS at 05:04

## 2018-04-02 NOTE — ED PROVIDER NOTES
Encounter Date: 2018       History     Chief Complaint   Patient presents with    Back Pain     back pain for 1 week, now she states that her legs are weak and hands feels numb     38-year-old female presents to emergency room complaining of lower back pain times one weeks his back pain that radiated up to her neck.  States pain is worse with waking up in the morning.  Denies any back injury.  States she feels like she slept wrong and it is making her legs week.  States she sometimes also wakes up with numbness in her arms from lying on them.  States she took ibuprofen with no relief.          Review of patient's allergies indicates:  No Known Allergies  Past Medical History:   Diagnosis Date    Hypertension      Past Surgical History:   Procedure Laterality Date     SECTION      x 3    TONSILLECTOMY       History reviewed. No pertinent family history.  Social History   Substance Use Topics    Smoking status: Current Every Day Smoker     Packs/day: 0.50     Types: Cigarettes    Smokeless tobacco: Never Used    Alcohol use Yes      Comment: rarely     Review of Systems   Constitutional: Negative for fever.   HENT: Negative for sore throat.    Respiratory: Negative for shortness of breath.    Cardiovascular: Negative for chest pain.   Gastrointestinal: Negative for nausea.   Genitourinary: Negative for dysuria.   Musculoskeletal: Positive for back pain.   Skin: Negative for rash.   Neurological: Negative for weakness.   Hematological: Does not bruise/bleed easily.   All other systems reviewed and are negative.      Physical Exam     Initial Vitals [18 1640]   BP Pulse Resp Temp SpO2   134/79 87 20 99.4 °F (37.4 °C) 100 %      MAP       97.33         Physical Exam    Nursing note and vitals reviewed.  Constitutional: She appears well-developed and well-nourished. No distress.   HENT:   Head: Normocephalic.   Eyes: Conjunctivae are normal.   Neck: Normal range of motion. Neck supple.    Cardiovascular: Normal rate.   Pulmonary/Chest: Breath sounds normal. No respiratory distress.   Abdominal: Soft. She exhibits no distension and no abdominal bruit. There is no tenderness. No hernia.   Musculoskeletal:        Cervical back: Normal.        Thoracic back: Normal.        Lumbar back: She exhibits tenderness, pain and spasm. She exhibits normal range of motion and no bony tenderness.   Neurological: She is alert and oriented to person, place, and time.   Skin: Skin is warm and dry. Capillary refill takes less than 2 seconds.   Psychiatric: She has a normal mood and affect. Her behavior is normal. Judgment and thought content normal.         ED Course   Procedures  Labs Reviewed - No data to display          Medical Decision Making:   Initial Assessment:   38-year-old female presents to emergency room complaining of lower back pain times one weeks his back pain that radiated up to her neck.  States pain is worse with waking up in the morning.  Denies any back injury.  States she feels like she slept wrong and it is making her legs week.  States she sometimes also wakes up with numbness in her arms from lying on them.  States she took ibuprofen with no relief.  Patient denies any numbness or tingling in lower extremities.  Denies any loss of bladder or bowel control.  Hand  are equal in upper extremities.  Strength is normal in lower extremities.  Ambulance with steady gait.  Exam is unremarkable.  Patient has no spinal tenderness on exam.  Differential Diagnosis:   Muscle strain, muscle spasms, ligament injuries, lumbar radiculopathy, DJD, arthritis, stenosis  ED Management:  Based on exam I do not believe that the patient has been made for imaging at this time.  We will give dexamethasone the emergency room.  Patient will prescribe muscle relaxer anti-inflammatory. I instructed patient to apply ice the area as needed for pain.  We discussed range of motion exercises to help with symptoms.  I  instructed the patient to follow primary care in 3-5 days if symptoms continue as she may need physical therapy to help alleviate the symptoms completely.  Avoid heat application to the area.  If shortness of breath chest pain or any worsening symptoms present return to the emergency room.                      Clinical Impression:   The encounter diagnosis was Back strain, initial encounter.                           Lavern Cohen NP  04/02/18 7210

## 2018-07-28 ENCOUNTER — HOSPITAL ENCOUNTER (EMERGENCY)
Facility: HOSPITAL | Age: 38
Discharge: HOME | End: 2018-07-28
Attending: EMERGENCY MEDICINE
Payer: MEDICAID

## 2018-07-28 VITALS
RESPIRATION RATE: 18 BRPM | HEART RATE: 76 BPM | BODY MASS INDEX: 24.99 KG/M2 | TEMPERATURE: 98 F | OXYGEN SATURATION: 100 % | DIASTOLIC BLOOD PRESSURE: 78 MMHG | WEIGHT: 150 LBS | SYSTOLIC BLOOD PRESSURE: 149 MMHG | HEIGHT: 65 IN

## 2018-07-28 DIAGNOSIS — M54.50 LUMBAR BACK PAIN: Primary | ICD-10-CM

## 2018-07-28 LAB
B-HCG UR QL: NEGATIVE
BILIRUB UR QL STRIP: NEGATIVE
CLARITY UR REFRACT.AUTO: CLEAR
COLOR UR AUTO: YELLOW
GLUCOSE UR QL STRIP: NEGATIVE
HGB UR QL STRIP: ABNORMAL
KETONES UR QL STRIP: NEGATIVE
LEUKOCYTE ESTERASE UR QL STRIP: NEGATIVE
NITRITE UR QL STRIP: NEGATIVE
PH UR STRIP: 7 [PH] (ref 5–8)
PROT UR QL STRIP: NEGATIVE
SP GR UR STRIP: 1.01 (ref 1–1.03)
URN SPEC COLLECT METH UR: ABNORMAL
UROBILINOGEN UR STRIP-ACNC: NEGATIVE EU/DL

## 2018-07-28 PROCEDURE — 63600175 PHARM REV CODE 636 W HCPCS: Performed by: EMERGENCY MEDICINE

## 2018-07-28 PROCEDURE — 81025 URINE PREGNANCY TEST: CPT

## 2018-07-28 PROCEDURE — 81003 URINALYSIS AUTO W/O SCOPE: CPT

## 2018-07-28 PROCEDURE — 25000003 PHARM REV CODE 250: Performed by: EMERGENCY MEDICINE

## 2018-07-28 PROCEDURE — 99284 EMERGENCY DEPT VISIT MOD MDM: CPT | Mod: 25

## 2018-07-28 PROCEDURE — 96372 THER/PROPH/DIAG INJ SC/IM: CPT

## 2018-07-28 RX ORDER — CYCLOBENZAPRINE HCL 10 MG
10 TABLET ORAL 3 TIMES DAILY PRN
Qty: 15 TABLET | Refills: 0 | Status: SHIPPED | OUTPATIENT
Start: 2018-07-28 | End: 2018-08-02

## 2018-07-28 RX ORDER — DIAZEPAM 5 MG/1
5 TABLET ORAL
Status: COMPLETED | OUTPATIENT
Start: 2018-07-28 | End: 2018-07-28

## 2018-07-28 RX ORDER — KETOROLAC TROMETHAMINE 30 MG/ML
60 INJECTION, SOLUTION INTRAMUSCULAR; INTRAVENOUS
Status: COMPLETED | OUTPATIENT
Start: 2018-07-28 | End: 2018-07-28

## 2018-07-28 RX ORDER — DOCUSATE SODIUM 100 MG/1
100 CAPSULE, LIQUID FILLED ORAL 2 TIMES DAILY
Qty: 60 CAPSULE | Refills: 0 | Status: SHIPPED | OUTPATIENT
Start: 2018-07-28 | End: 2019-05-25

## 2018-07-28 RX ORDER — HYDROCODONE BITARTRATE AND ACETAMINOPHEN 5; 325 MG/1; MG/1
1 TABLET ORAL EVERY 4 HOURS PRN
Qty: 18 TABLET | Refills: 0 | Status: SHIPPED | OUTPATIENT
Start: 2018-07-28 | End: 2018-10-08

## 2018-07-28 RX ADMIN — DIAZEPAM 5 MG: 5 TABLET ORAL at 08:07

## 2018-07-28 RX ADMIN — KETOROLAC TROMETHAMINE 60 MG: 30 INJECTION, SOLUTION INTRAMUSCULAR at 08:07

## 2018-07-28 NOTE — ED TRIAGE NOTES
Pt presents to the ED c/o lower back pain- states she usually does have back pain on a daily basis but slipped in shower last night and irritated pain worse. Pain is in sacral area-midline and radiates to bilateral hips. Taking 800 mg ibuprofen with no relief. Denies any other injury with fall.

## 2018-07-28 NOTE — ED PROVIDER NOTES
"Encounter Date: 2018       History     Chief Complaint   Patient presents with    Back Pain     Pt c/o lower back pain x  1 week, states slipped in shower last pm and "hit it on the tub" and is now having increased lower back pain.  Denies LOC with fall.  Denies urinary complications, denies vaginal d/c.      Patient is a 38-year-old woman comes in with lower back pain times approximately 12 hours.  Patient was watched and lites and the bathtub and slipped the lower back.  Patient had hoped pain subside overnight.  Patient denies weakness or numbness in her legs she denies urinary retention or incontinence.  She denies incontinence of stoo.  Pain is l10/10 and worse with movement          Review of patient's allergies indicates:  No Known Allergies  Past Medical History:   Diagnosis Date    Hypertension      Past Surgical History:   Procedure Laterality Date     SECTION      x 3    TONSILLECTOMY       History reviewed. No pertinent family history.  Social History   Substance Use Topics    Smoking status: Current Every Day Smoker     Packs/day: 0.50     Types: Cigarettes    Smokeless tobacco: Never Used    Alcohol use Yes      Comment: rarely     Review of Systems   Constitutional: Negative for fever.   HENT: Negative for sore throat.    Respiratory: Negative for chest tightness and shortness of breath.    Cardiovascular: Negative for chest pain, palpitations and leg swelling.   Gastrointestinal: Negative for abdominal pain and nausea.   Genitourinary: Negative for decreased urine volume, difficulty urinating, dysuria, flank pain and hematuria.   Musculoskeletal: Positive for back pain.   Skin: Negative for rash.   Neurological: Negative for weakness and numbness.   Hematological: Does not bruise/bleed easily.       Physical Exam     Initial Vitals [18 0805]   BP Pulse Resp Temp SpO2   (!) 149/78 76 18 98.4 °F (36.9 °C) 100 %      MAP       --         Physical Exam    Nursing note and vitals " reviewed.  Constitutional: Vital signs are normal. She appears well-developed and well-nourished. She is not diaphoretic. No distress.   HENT:   Head: Normocephalic and atraumatic.   Eyes: Conjunctivae and EOM are normal. Pupils are equal, round, and reactive to light.   Neck: Normal range of motion. Neck supple.   Cardiovascular: Normal rate, regular rhythm and normal heart sounds.   Pulmonary/Chest: Breath sounds normal. No respiratory distress. She has no wheezes. She has no rhonchi. She has no rales.   Abdominal: Soft. She exhibits no distension. There is no tenderness. There is no rebound and no guarding.   Musculoskeletal: Normal range of motion. She exhibits tenderness (midline lumbar ttp. No step offs.). She exhibits no edema.   Neurological: She is alert and oriented to person, place, and time.   Skin: Skin is warm and dry.   Psychiatric: She has a normal mood and affect.         ED Course   Procedures  Labs Reviewed   URINALYSIS, REFLEX TO URINE CULTURE - Abnormal; Notable for the following:        Result Value    Occult Blood UA Trace (*)     All other components within normal limits    Narrative:     Preferred Collection Type->Urine, Clean Catch   PREGNANCY TEST, URINE RAPID          Imaging Results    None                    Imaging Results          X-Ray Lumbar Spine Ap And Lateral (Final result)  Result time 07/28/18 09:18:34    Final result by Jamie Chavez MD (07/28/18 09:18:34)                 Impression:      1.  Negative for acute process involving the lumbar spine.    2.  Nonemergent findings as described above.      Electronically signed by: Jamie Chavez MD  Date:    07/28/2018  Time:    09:18             Narrative:    EXAMINATION:  XR LUMBAR SPINE AP AND LATERAL    CLINICAL HISTORY:  Low back painLow back pain, <6wks, no red flags, no prior management;    COMPARISON:  No comparison studies are available.    FINDINGS:  There are 5 weight bearing lumbar vertebra.  Minimal convex left  curvature of the lumbar spine.  Minor multilevel marginal spondylosis.  The vertebral body heights and intervertebral disc heights are   well-maintained. Negative for spondylolysis or spondylolisthesis. The sacral ala and sacroiliac joints are intact. The bowel gas pattern is normal.    Pelvic phleboliths versus ureteroliths.                              Labs Reviewed   URINALYSIS, REFLEX TO URINE CULTURE - Abnormal; Notable for the following:        Result Value    Occult Blood UA Trace (*)     All other components within normal limits    Narrative:     Preferred Collection Type->Urine, Clean Catch   PREGNANCY TEST, URINE RAPID                   Clinical Impression:   The encounter diagnosis was Lumbar back pain.                             Ruslan Ayers MD  07/28/18 5726     no STS on xray, doubt neck infection. given potassium, likely related to new HCTZ.  pt has f/u with PMD next week.  /87.  no focal neuro deficits.   I have discussed the discharge plan with the patient. The patient agrees with the plan, as discussed.  The patient understands Emergency Department diagnosis is a preliminary diagnosis often based on limited information and that the patient must adhere to the follow-up plan as discussed.  The patient understands that if the symptoms worsen the patient may return to the Emergency Department at any time for further evaluation and treatment.

## 2018-08-03 ENCOUNTER — LAB VISIT (OUTPATIENT)
Dept: LAB | Facility: HOSPITAL | Age: 38
End: 2018-08-03
Attending: INTERNAL MEDICINE
Payer: MEDICAID

## 2018-08-03 DIAGNOSIS — I10 ESSENTIAL (PRIMARY) HYPERTENSION: ICD-10-CM

## 2018-08-03 DIAGNOSIS — M62.81 MUSCLE WEAKNESS (GENERALIZED): Primary | ICD-10-CM

## 2018-08-03 LAB
25(OH)D3+25(OH)D2 SERPL-MCNC: 16 NG/ML
ALBUMIN SERPL BCP-MCNC: 3.9 G/DL
ALP SERPL-CCNC: 41 U/L
ALT SERPL W/O P-5'-P-CCNC: 11 U/L
ANION GAP SERPL CALC-SCNC: 4 MMOL/L
AST SERPL-CCNC: 21 U/L
BILIRUB SERPL-MCNC: 0.5 MG/DL
BUN SERPL-MCNC: 12 MG/DL
CALCIUM SERPL-MCNC: 8.5 MG/DL
CHLORIDE SERPL-SCNC: 105 MMOL/L
CK MB SERPL-MCNC: 1.6 NG/ML
CK MB SERPL-RTO: 1 %
CK SERPL-CCNC: 157 U/L
CO2 SERPL-SCNC: 29 MMOL/L
CREAT SERPL-MCNC: 0.65 MG/DL
EST. GFR  (AFRICAN AMERICAN): >60 ML/MIN/1.73 M^2
EST. GFR  (NON AFRICAN AMERICAN): >60 ML/MIN/1.73 M^2
ESTIMATED AVG GLUCOSE: 103 MG/DL
GLUCOSE SERPL-MCNC: 83 MG/DL
HBA1C MFR BLD HPLC: 5.2 %
MAGNESIUM SERPL-MCNC: 1.8 MG/DL
PHOSPHATE SERPL-MCNC: 4 MG/DL
POTASSIUM SERPL-SCNC: 3.8 MMOL/L
PROT SERPL-MCNC: 7 G/DL
SODIUM SERPL-SCNC: 138 MMOL/L

## 2018-08-03 PROCEDURE — 36415 COLL VENOUS BLD VENIPUNCTURE: CPT | Mod: PO

## 2018-08-03 PROCEDURE — 84100 ASSAY OF PHOSPHORUS: CPT | Mod: PO

## 2018-08-03 PROCEDURE — 82550 ASSAY OF CK (CPK): CPT

## 2018-08-03 PROCEDURE — 82306 VITAMIN D 25 HYDROXY: CPT | Mod: PO

## 2018-08-03 PROCEDURE — 80053 COMPREHEN METABOLIC PANEL: CPT | Mod: PO

## 2018-08-03 PROCEDURE — 82553 CREATINE MB FRACTION: CPT

## 2018-08-03 PROCEDURE — 83036 HEMOGLOBIN GLYCOSYLATED A1C: CPT

## 2018-08-03 PROCEDURE — 83735 ASSAY OF MAGNESIUM: CPT | Mod: PO

## 2018-10-08 ENCOUNTER — HOSPITAL ENCOUNTER (EMERGENCY)
Facility: HOSPITAL | Age: 38
Discharge: HOME | End: 2018-10-08
Attending: FAMILY MEDICINE
Payer: MEDICAID

## 2018-10-08 VITALS
TEMPERATURE: 99 F | OXYGEN SATURATION: 100 % | SYSTOLIC BLOOD PRESSURE: 121 MMHG | HEART RATE: 85 BPM | RESPIRATION RATE: 18 BRPM | WEIGHT: 148 LBS | BODY MASS INDEX: 24.63 KG/M2 | DIASTOLIC BLOOD PRESSURE: 59 MMHG

## 2018-10-08 DIAGNOSIS — N39.0 LOWER URINARY TRACT INFECTIOUS DISEASE: Primary | ICD-10-CM

## 2018-10-08 LAB
BILIRUB UR QL STRIP: NEGATIVE
CLARITY UR REFRACT.AUTO: CLEAR
COLOR UR AUTO: YELLOW
GLUCOSE UR QL STRIP: NEGATIVE
HGB UR QL STRIP: ABNORMAL
KETONES UR QL STRIP: NEGATIVE
LEUKOCYTE ESTERASE UR QL STRIP: ABNORMAL
MICROSCOPIC COMMENT: NORMAL
NITRITE UR QL STRIP: NEGATIVE
PH UR STRIP: 7 [PH] (ref 5–8)
PROT UR QL STRIP: NEGATIVE
RBC #/AREA URNS AUTO: 2 /HPF (ref 0–4)
SP GR UR STRIP: 1.01 (ref 1–1.03)
URN SPEC COLLECT METH UR: ABNORMAL
UROBILINOGEN UR STRIP-ACNC: NEGATIVE EU/DL
WBC #/AREA URNS AUTO: 2 /HPF (ref 0–5)

## 2018-10-08 PROCEDURE — 99284 EMERGENCY DEPT VISIT MOD MDM: CPT

## 2018-10-08 PROCEDURE — 81000 URINALYSIS NONAUTO W/SCOPE: CPT

## 2018-10-08 PROCEDURE — 25000003 PHARM REV CODE 250: Performed by: FAMILY MEDICINE

## 2018-10-08 RX ORDER — TRAMADOL HYDROCHLORIDE 50 MG/1
50 TABLET ORAL EVERY 8 HOURS PRN
Qty: 8 TABLET | Refills: 0 | Status: SHIPPED | OUTPATIENT
Start: 2018-10-08 | End: 2018-10-18

## 2018-10-08 RX ORDER — PHENAZOPYRIDINE HYDROCHLORIDE 100 MG/1
200 TABLET, FILM COATED ORAL
Status: COMPLETED | OUTPATIENT
Start: 2018-10-08 | End: 2018-10-08

## 2018-10-08 RX ORDER — IBUPROFEN 600 MG/1
600 TABLET ORAL
Status: COMPLETED | OUTPATIENT
Start: 2018-10-08 | End: 2018-10-08

## 2018-10-08 RX ORDER — SULFAMETHOXAZOLE AND TRIMETHOPRIM 800; 160 MG/1; MG/1
1 TABLET ORAL
Status: COMPLETED | OUTPATIENT
Start: 2018-10-08 | End: 2018-10-08

## 2018-10-08 RX ORDER — SULFAMETHOXAZOLE AND TRIMETHOPRIM 800; 160 MG/1; MG/1
1 TABLET ORAL 2 TIMES DAILY
Qty: 20 TABLET | Refills: 0 | Status: SHIPPED | OUTPATIENT
Start: 2018-10-08 | End: 2018-10-18

## 2018-10-08 RX ORDER — PHENAZOPYRIDINE HYDROCHLORIDE 200 MG/1
200 TABLET, FILM COATED ORAL 3 TIMES DAILY PRN
Qty: 15 TABLET | Refills: 0 | Status: SHIPPED | OUTPATIENT
Start: 2018-10-08 | End: 2018-10-18

## 2018-10-08 RX ADMIN — SULFAMETHOXAZOLE AND TRIMETHOPRIM 1 TABLET: 800; 160 TABLET ORAL at 02:10

## 2018-10-08 RX ADMIN — IBUPROFEN 600 MG: 600 TABLET ORAL at 02:10

## 2018-10-08 RX ADMIN — PHENAZOPYRIDINE HYDROCHLORIDE 200 MG: 100 TABLET ORAL at 02:10

## 2018-10-08 NOTE — ED PROVIDER NOTES
Encounter Date: 10/8/2018       History     Chief Complaint   Patient presents with    Abdominal Pain     lower abd pain for 3 days. having difficulty urination only small amounts of urine at a time    Dysuria     38-year-old female complains of suprapubic pain tenderness.  Complains of dysuria, increased frequency of urination.  No vaginal discharge or hematuria.  Denies nausea, vomiting, diarrhea.  No fever.  No flank pain.      The history is provided by the patient.     Review of patient's allergies indicates:  No Known Allergies  Past Medical History:   Diagnosis Date    Hypertension      Past Surgical History:   Procedure Laterality Date     SECTION      x 3    TONSILLECTOMY       History reviewed. No pertinent family history.  Social History     Tobacco Use    Smoking status: Current Every Day Smoker     Packs/day: 0.50     Types: Cigarettes    Smokeless tobacco: Never Used   Substance Use Topics    Alcohol use: Yes     Comment: rarely    Drug use: No     Review of Systems   Constitutional: Negative for activity change, appetite change, chills and fever.   HENT: Negative for congestion, ear discharge, rhinorrhea, sinus pressure, sinus pain, sore throat and trouble swallowing.    Eyes: Negative for photophobia, pain, discharge, redness, itching and visual disturbance.   Respiratory: Negative for cough, chest tightness, shortness of breath and wheezing.    Cardiovascular: Negative for chest pain, palpitations and leg swelling.   Gastrointestinal: Positive for abdominal distention. Negative for abdominal pain, constipation, diarrhea, nausea and vomiting.   Genitourinary: Positive for difficulty urinating, dysuria, frequency and pelvic pain. Negative for decreased urine volume, flank pain, hematuria, menstrual problem, urgency, vaginal bleeding, vaginal discharge and vaginal pain.   Musculoskeletal: Negative for back pain, gait problem, neck pain and neck stiffness.   Skin: Negative for rash and  wound.   Neurological: Negative for dizziness, tremors, seizures, syncope, speech difficulty, weakness, light-headedness, numbness and headaches.   Psychiatric/Behavioral: Negative for behavioral problems, confusion, hallucinations and sleep disturbance. The patient is not nervous/anxious.    All other systems reviewed and are negative.      Physical Exam     Initial Vitals [10/08/18 1322]   BP Pulse Resp Temp SpO2   (!) 121/59 85 18 99.4 °F (37.4 °C) 100 %      MAP       --         Physical Exam    Nursing note and vitals reviewed.  Constitutional: Vital signs are normal. She appears well-developed and well-nourished. She is active.   HENT:   Head: Normocephalic and atraumatic.   Nose: Nose normal.   Mouth/Throat: Oropharynx is clear and moist.   Eyes: Conjunctivae and lids are normal.   Neck: Trachea normal, normal range of motion and full passive range of motion without pain. Neck supple. Normal range of motion present. No neck rigidity.   Cardiovascular: Normal rate, regular rhythm, S1 normal, S2 normal, normal heart sounds, intact distal pulses and normal pulses.   Pulmonary/Chest: Breath sounds normal. No respiratory distress. She has no wheezes. She has no rhonchi. She has no rales. She exhibits no tenderness.   Abdominal: Soft. Normal appearance and bowel sounds are normal. She exhibits no distension. There is tenderness in the suprapubic area. There is guarding. There is no rigidity and no CVA tenderness.       Musculoskeletal: Normal range of motion.   Lymphadenopathy:     She has no cervical adenopathy.   Neurological: She is alert and oriented to person, place, and time. She has normal reflexes. No cranial nerve deficit or sensory deficit. GCS eye subscore is 4. GCS verbal subscore is 5. GCS motor subscore is 6.   Skin: Skin is warm and intact. Capillary refill takes less than 2 seconds. No abrasion, no bruising and no rash noted.   Psychiatric: Her speech is normal. Thought content normal. She is not  actively hallucinating. Cognition and memory are normal. She is attentive.         ED Course   Procedures  Labs Reviewed   URINALYSIS, REFLEX TO URINE CULTURE          Imaging Results    None          Medical Decision Making:   Initial Assessment:   Patient complains of dysuria, increased frequency with small amount of urination for last 3 days.  Denies any fever.  No nausea or vomiting. She denies bleeding or discharge. No constipation.  Differential Diagnosis:   Urinary tract infection, cystitis, pelvic inflammatory disease.  Clinical Tests:   Lab Tests: Ordered and Reviewed  ED Management:  Mild urinary tract infection is treated with antibiotics-Bactrim/Pyridium, and a prescription has been prescribed.  Patient is advised to follow up with primary care physician or to the ER if symptoms persist or worsen.                      Clinical Impression:   The encounter diagnosis was Lower urinary tract infectious disease.      Disposition:   Disposition: Discharged  Condition: Fair                        Rj Rodriguez MD  10/08/18 7447

## 2018-12-22 ENCOUNTER — HOSPITAL ENCOUNTER (EMERGENCY)
Facility: HOSPITAL | Age: 38
Discharge: HOME | End: 2018-12-22
Attending: SURGERY
Payer: MEDICAID

## 2018-12-22 VITALS
RESPIRATION RATE: 20 BRPM | WEIGHT: 144 LBS | SYSTOLIC BLOOD PRESSURE: 164 MMHG | TEMPERATURE: 99 F | HEIGHT: 65 IN | BODY MASS INDEX: 23.99 KG/M2 | DIASTOLIC BLOOD PRESSURE: 80 MMHG | OXYGEN SATURATION: 99 % | HEART RATE: 72 BPM

## 2018-12-22 DIAGNOSIS — A08.4 VIRAL GASTROENTERITIS: Primary | ICD-10-CM

## 2018-12-22 LAB
ALBUMIN SERPL BCP-MCNC: 3.8 G/DL
ALP SERPL-CCNC: 50 U/L
ALT SERPL W/O P-5'-P-CCNC: 20 U/L
ANION GAP SERPL CALC-SCNC: 4 MMOL/L
AST SERPL-CCNC: 29 U/L
BASOPHILS # BLD AUTO: 0.03 K/UL
BASOPHILS NFR BLD: 0.6 %
BILIRUB SERPL-MCNC: 0.4 MG/DL
BILIRUB UR QL STRIP: NEGATIVE
BUN SERPL-MCNC: 17 MG/DL
CALCIUM SERPL-MCNC: 8.8 MG/DL
CHLORIDE SERPL-SCNC: 106 MMOL/L
CLARITY UR REFRACT.AUTO: CLEAR
CO2 SERPL-SCNC: 27 MMOL/L
COLOR UR AUTO: YELLOW
CREAT SERPL-MCNC: 0.74 MG/DL
DIFFERENTIAL METHOD: ABNORMAL
EOSINOPHIL # BLD AUTO: 0.1 K/UL
EOSINOPHIL NFR BLD: 1.6 %
ERYTHROCYTE [DISTWIDTH] IN BLOOD BY AUTOMATED COUNT: 17.8 %
EST. GFR  (AFRICAN AMERICAN): >60 ML/MIN/1.73 M^2
EST. GFR  (NON AFRICAN AMERICAN): >60 ML/MIN/1.73 M^2
GLUCOSE SERPL-MCNC: 82 MG/DL
GLUCOSE UR QL STRIP: NEGATIVE
HCT VFR BLD AUTO: 28.9 %
HGB BLD-MCNC: 8.9 G/DL
HGB UR QL STRIP: ABNORMAL
KETONES UR QL STRIP: NEGATIVE
LEUKOCYTE ESTERASE UR QL STRIP: NEGATIVE
LIPASE SERPL-CCNC: 110 U/L
LYMPHOCYTES # BLD AUTO: 1.4 K/UL
LYMPHOCYTES NFR BLD: 28.9 %
MCH RBC QN AUTO: 23.4 PG
MCHC RBC AUTO-ENTMCNC: 30.8 G/DL
MCV RBC AUTO: 76 FL
MICROSCOPIC COMMENT: NORMAL
MONOCYTES # BLD AUTO: 0.5 K/UL
MONOCYTES NFR BLD: 9 %
NEUTROPHILS # BLD AUTO: 3 K/UL
NEUTROPHILS NFR BLD: 59.7 %
NITRITE UR QL STRIP: NEGATIVE
PH UR STRIP: 6 [PH] (ref 5–8)
PLATELET # BLD AUTO: 256 K/UL
PMV BLD AUTO: 10.2 FL
POTASSIUM SERPL-SCNC: 4 MMOL/L
PROT SERPL-MCNC: 6.8 G/DL
PROT UR QL STRIP: NEGATIVE
RBC # BLD AUTO: 3.8 M/UL
RBC #/AREA URNS AUTO: 3 /HPF (ref 0–4)
SODIUM SERPL-SCNC: 137 MMOL/L
SP GR UR STRIP: 1.02 (ref 1–1.03)
URN SPEC COLLECT METH UR: ABNORMAL
UROBILINOGEN UR STRIP-ACNC: NEGATIVE EU/DL
WBC # BLD AUTO: 4.99 K/UL
WBC #/AREA URNS AUTO: 1 /HPF (ref 0–5)

## 2018-12-22 PROCEDURE — 99284 EMERGENCY DEPT VISIT MOD MDM: CPT | Mod: 25

## 2018-12-22 PROCEDURE — 85025 COMPLETE CBC W/AUTO DIFF WBC: CPT

## 2018-12-22 PROCEDURE — 80053 COMPREHEN METABOLIC PANEL: CPT

## 2018-12-22 PROCEDURE — 83690 ASSAY OF LIPASE: CPT

## 2018-12-22 PROCEDURE — 63600175 PHARM REV CODE 636 W HCPCS: Performed by: PHYSICIAN ASSISTANT

## 2018-12-22 PROCEDURE — 96374 THER/PROPH/DIAG INJ IV PUSH: CPT

## 2018-12-22 PROCEDURE — 25000003 PHARM REV CODE 250: Performed by: PHYSICIAN ASSISTANT

## 2018-12-22 PROCEDURE — 81000 URINALYSIS NONAUTO W/SCOPE: CPT

## 2018-12-22 PROCEDURE — 96361 HYDRATE IV INFUSION ADD-ON: CPT

## 2018-12-22 RX ORDER — ONDANSETRON 4 MG/1
4 TABLET, FILM COATED ORAL EVERY 6 HOURS
Qty: 12 TABLET | Refills: 0 | Status: SHIPPED | OUTPATIENT
Start: 2018-12-22 | End: 2019-08-31

## 2018-12-22 RX ORDER — DICYCLOMINE HYDROCHLORIDE 20 MG/1
20 TABLET ORAL 3 TIMES DAILY PRN
Qty: 15 TABLET | Refills: 0 | Status: SHIPPED | OUTPATIENT
Start: 2018-12-22 | End: 2019-01-21

## 2018-12-22 RX ORDER — ONDANSETRON 2 MG/ML
4 INJECTION INTRAMUSCULAR; INTRAVENOUS
Status: COMPLETED | OUTPATIENT
Start: 2018-12-22 | End: 2018-12-22

## 2018-12-22 RX ORDER — DICYCLOMINE HYDROCHLORIDE 10 MG/1
20 CAPSULE ORAL
Status: COMPLETED | OUTPATIENT
Start: 2018-12-22 | End: 2018-12-22

## 2018-12-22 RX ADMIN — SODIUM CHLORIDE 1000 ML: 0.9 INJECTION, SOLUTION INTRAVENOUS at 04:12

## 2018-12-22 RX ADMIN — DICYCLOMINE HYDROCHLORIDE 20 MG: 10 CAPSULE ORAL at 04:12

## 2018-12-22 RX ADMIN — ONDANSETRON 4 MG: 2 INJECTION INTRAMUSCULAR; INTRAVENOUS at 04:12

## 2018-12-22 NOTE — ED NOTES
"Pt denies N/V, Inquired it pt had diarrhea, pt stated "I can wait until I get home"  Informed Provider  "

## 2018-12-22 NOTE — ED PROVIDER NOTES
Encounter Date: 2018       History     Chief Complaint   Patient presents with    Diarrhea     diarrhea and emesis x 2 days; 6-7 episodes of diarrhea and 4 episodes of vomiting reported.    Vomiting     Patient is a 38-year-old female presenting with complaint of 2 day history of several episodes of vomiting and diarrhea.  She complains of diffuse mild intermittent abdominal cramping.  She is feeling generally weak today.  No recent travel, antibiotics or known exposure to illness.  Her daughter started with some diarrhea symptoms today.  No melena or hematochezia.  No treatment prior to arrival.          Review of patient's allergies indicates:  No Known Allergies  Past Medical History:   Diagnosis Date    Hypertension      Past Surgical History:   Procedure Laterality Date     SECTION      x 3    TONSILLECTOMY       History reviewed. No pertinent family history.  Social History     Tobacco Use    Smoking status: Current Every Day Smoker     Packs/day: 0.50     Types: Cigarettes    Smokeless tobacco: Never Used   Substance Use Topics    Alcohol use: Yes     Comment: rarely    Drug use: No     Review of Systems   Constitutional: Positive for appetite change and fatigue. Negative for activity change, chills and fever.   HENT: Negative for congestion, ear pain, sore throat, trouble swallowing and voice change.    Respiratory: Negative for cough, shortness of breath and wheezing.    Cardiovascular: Negative for chest pain, palpitations and leg swelling.   Gastrointestinal: Positive for abdominal pain, diarrhea, nausea and vomiting. Negative for blood in stool.   Musculoskeletal: Negative for back pain, neck pain and neck stiffness.   Skin: Negative for rash and wound.   Neurological: Positive for headaches. Negative for dizziness, weakness and numbness.   All other systems reviewed and are negative.      Physical Exam     Initial Vitals [18 1541]   BP Pulse Resp Temp SpO2   132/75 76 20  98.8 °F (37.1 °C) 99 %      MAP       --         Physical Exam    Nursing note and vitals reviewed.  Constitutional: She appears well-developed and well-nourished. She appears distressed (Malaise).   HENT:   Head: Normocephalic and atraumatic.   Nose: Nose normal.   Mouth/Throat: Oropharynx is clear and moist.   Eyes: Conjunctivae and EOM are normal. Pupils are equal, round, and reactive to light.   Neck: Normal range of motion. Neck supple.   Cardiovascular: Normal rate, regular rhythm, normal heart sounds and intact distal pulses.   Pulmonary/Chest: Breath sounds normal. No respiratory distress.   Abdominal: Soft. Bowel sounds are normal. She exhibits no distension. There is no rebound and no guarding.   Mild diffuse tenderness to palpation.  No rebound tenderness or guarding.  No tenderness over McBurney's point.   Musculoskeletal: She exhibits no edema.   Lymphadenopathy:     She has no cervical adenopathy.   Neurological: She is alert and oriented to person, place, and time.   Skin: Skin is warm and dry. No rash noted.   Psychiatric: She has a normal mood and affect. Her behavior is normal. Judgment and thought content normal.         ED Course   Procedures  Labs Reviewed   CBC W/ AUTO DIFFERENTIAL - Abnormal; Notable for the following components:       Result Value    RBC 3.80 (*)     Hemoglobin 8.9 (*)     Hematocrit 28.9 (*)     MCV 76 (*)     MCH 23.4 (*)     MCHC 30.8 (*)     RDW 17.8 (*)     All other components within normal limits   COMPREHENSIVE METABOLIC PANEL - Abnormal; Notable for the following components:    Anion Gap 4 (*)     All other components within normal limits   URINALYSIS, REFLEX TO URINE CULTURE - Abnormal; Notable for the following components:    Occult Blood UA 1+ (*)     All other components within normal limits    Narrative:     Preferred Collection Type->Urine, Clean Catch   LIPASE   URINALYSIS MICROSCOPIC    Narrative:     Preferred Collection Type->Urine, Clean Catch           Imaging Results    None          Medical Decision Making:   Clinical Tests:   Lab Tests: Ordered and Reviewed  The following lab test(s) were unremarkable: CBC, CMP, Lipase, Urinalysis and UPT  ED Management:  Patient was feeling much better after treatment.  She was able to tolerate p.o. with no further episodes of vomiting.  She felt she was ready to go home.  She was given a prescription for Zofran and Bentyl.  Advised on supportive care.  Follow up with PCP on Monday.  Return to the ED if worsen anyway.                      Clinical Impression:   The encounter diagnosis was Viral gastroenteritis.      Disposition:   Disposition: Discharged                        OPAL Cleary  12/22/18 6076

## 2018-12-22 NOTE — DISCHARGE INSTRUCTIONS
Follow-up with her PCP on Monday.  Return to the ED for severe pain, vomiting and unable to keep fluids down, black or bloody stool or worsen anyway

## 2019-03-11 ENCOUNTER — HOSPITAL ENCOUNTER (EMERGENCY)
Facility: HOSPITAL | Age: 39
Discharge: HOME | End: 2019-03-11
Attending: FAMILY MEDICINE
Payer: MEDICAID

## 2019-03-11 VITALS
SYSTOLIC BLOOD PRESSURE: 129 MMHG | OXYGEN SATURATION: 100 % | TEMPERATURE: 99 F | WEIGHT: 140 LBS | RESPIRATION RATE: 20 BRPM | HEART RATE: 89 BPM | BODY MASS INDEX: 23.32 KG/M2 | DIASTOLIC BLOOD PRESSURE: 75 MMHG | HEIGHT: 65 IN

## 2019-03-11 DIAGNOSIS — H65.91 FLUID LEVEL BEHIND TYMPANIC MEMBRANE OF RIGHT EAR: ICD-10-CM

## 2019-03-11 DIAGNOSIS — J06.9 VIRAL URI WITH COUGH: Primary | ICD-10-CM

## 2019-03-11 LAB
INFLUENZA A, MOLECULAR: NEGATIVE
INFLUENZA B, MOLECULAR: NEGATIVE
SPECIMEN SOURCE: NORMAL

## 2019-03-11 PROCEDURE — 99284 EMERGENCY DEPT VISIT MOD MDM: CPT | Mod: ER

## 2019-03-11 PROCEDURE — 87502 INFLUENZA DNA AMP PROBE: CPT | Mod: ER

## 2019-03-11 RX ORDER — CETIRIZINE HYDROCHLORIDE 10 MG/1
10 TABLET ORAL DAILY
Qty: 30 TABLET | Refills: 0 | Status: SHIPPED | OUTPATIENT
Start: 2019-03-11 | End: 2019-08-31

## 2019-03-11 RX ORDER — FLUTICASONE PROPIONATE 50 MCG
1 SPRAY, SUSPENSION (ML) NASAL 2 TIMES DAILY PRN
Qty: 15 G | Refills: 0 | Status: SHIPPED | OUTPATIENT
Start: 2019-03-11 | End: 2019-05-25

## 2019-03-11 RX ORDER — GUAIFENESIN 100 MG/5ML
100-200 SOLUTION ORAL EVERY 4 HOURS PRN
Qty: 60 ML | Refills: 0 | Status: SHIPPED | OUTPATIENT
Start: 2019-03-11 | End: 2019-03-21

## 2019-03-11 NOTE — ED PROVIDER NOTES
"Encounter Date: 3/11/2019       History     Chief Complaint   Patient presents with    Otalgia     PT reprots right ear pain x 4 days. PT reports cough, sneezing and runny nose x 3 days    Cough     Patient is a 39-year-old female who presents with cough, sneezing and ear pain for 4 days.  She reports past medical history significant for hypertension.  She reports fever 2 days ago of 101F. She reports her ear keeps "popping". She denied any drainage from the ear. She states recent exposure to the flu with her daughter and grandchild.  She reports taking TheraFlu at home with minimal relief.  She reports associated rhinorrhea. She denies any alleviating or exacerbating symptoms. She denies any sore throat, nausea, vomiting, abdominal pain or diarrhea.      The history is provided by the patient.     Review of patient's allergies indicates:  No Known Allergies  Past Medical History:   Diagnosis Date    Hypertension      Past Surgical History:   Procedure Laterality Date     SECTION      x 3    TONSILLECTOMY       History reviewed. No pertinent family history.  Social History     Tobacco Use    Smoking status: Current Every Day Smoker     Packs/day: 0.50     Types: Cigarettes    Smokeless tobacco: Never Used   Substance Use Topics    Alcohol use: Yes     Comment: rarely    Drug use: No     Review of Systems   Constitutional: Positive for fever. Negative for activity change, appetite change and chills.   HENT: Positive for congestion, ear pain and rhinorrhea. Negative for ear discharge and sore throat.    Eyes: Negative for redness and visual disturbance.   Respiratory: Negative for cough, chest tightness and shortness of breath.    Cardiovascular: Negative for chest pain.   Gastrointestinal: Negative for abdominal pain, diarrhea, nausea and vomiting.   Genitourinary: Negative for dysuria and frequency.   Musculoskeletal: Negative for back pain, neck pain and neck stiffness.   Skin: Negative for rash. "   Neurological: Negative for dizziness, syncope, numbness and headaches.       Physical Exam     Initial Vitals [03/11/19 0925]   BP Pulse Resp Temp SpO2   129/75 89 20 98.9 °F (37.2 °C) 100 %      MAP       --         Physical Exam    Nursing note and vitals reviewed.  Constitutional: She appears well-developed and well-nourished. She is cooperative.  Non-toxic appearance. She does not have a sickly appearance.   HENT:   Head: Normocephalic and atraumatic.   Right Ear: External ear and ear canal normal. No drainage or swelling. Tympanic membrane is not perforated and not erythematous. A middle ear effusion is present.   Left Ear: Tympanic membrane, external ear and ear canal normal.   Nose: Rhinorrhea present.   Mouth/Throat: Oropharynx is clear and moist. No posterior oropharyngeal edema or posterior oropharyngeal erythema.   Eyes: Conjunctivae and lids are normal. Pupils are equal, round, and reactive to light.   Neck: Normal range of motion and full passive range of motion without pain. Neck supple.   Cardiovascular: Normal rate, regular rhythm and normal heart sounds. Exam reveals no gallop and no friction rub.    No murmur heard.  Pulmonary/Chest: Breath sounds normal. She has no wheezes. She has no rhonchi. She has no rales.   Abdominal: Normal appearance.   Neurological: She is alert and oriented to person, place, and time.   Skin: Skin is warm, dry and intact. No rash noted.         ED Course   Procedures  Labs Reviewed   INFLUENZA A & B BY MOLECULAR          Imaging Results    None          Medical Decision Making:   Initial Assessment:   Patient is a 39-year-old female who presents with cough, sneezing and ear pain for 4 days.  She reports past medical history significant for hypertension.  She reports fever 2 days ago of 101F.  She states recent exposure to the flu with her daughter and grandchild.  She reports taking TheraFlu at home with minimal relief.  She reports associated rhinorrhea. She denies  any alleviating or exacerbating symptoms. She denies any sore throat, nausea, vomiting, abdominal pain or diarrhea.  Differential Diagnosis:   Otitis media  Otitis externa  Viral URI  ED Management:  Urgent evaluation of a 39 year old female with complaint of congestion, rhinorrhea, cough and ear pain.  Vital signs are stable.  Patient is afebrile.  Abdomen is soft and nontender.  There is no rebound, rigidity or distention.  I doubt intra-abdominal process.  Bilateral TMs with no erythema, retraction or perforation. She does have a middle ear effusion on the right with no sign of otitis media. There is no mastoid tenderness.  There is no movement tenderness to bilateral ears.  No tonsillar swelling or exudate noted.  Uvula is midline.  No concern for ludwigs angina. Breath sounds are clear and equal bilaterally. Workup is negative.  Suspect symptoms are secondary to viral illness.  Symptomatic treatment. Discussed results with patient. Return precautions given. Patient is to follow up with their primary care provider. All questions answered.                         Clinical Impression:       ICD-10-CM ICD-9-CM   1. Viral URI with cough J06.9 465.9    B97.89    2. Fluid level behind tympanic membrane of right ear H65.91 381.4                                Hilda Ahumada PA-C  03/11/19 1100

## 2019-04-12 ENCOUNTER — HOSPITAL ENCOUNTER (EMERGENCY)
Facility: HOSPITAL | Age: 39
Discharge: HOME | End: 2019-04-12
Attending: FAMILY MEDICINE
Payer: MEDICAID

## 2019-04-12 VITALS
WEIGHT: 138 LBS | BODY MASS INDEX: 22.99 KG/M2 | TEMPERATURE: 99 F | HEIGHT: 65 IN | DIASTOLIC BLOOD PRESSURE: 87 MMHG | RESPIRATION RATE: 20 BRPM | OXYGEN SATURATION: 100 % | HEART RATE: 69 BPM | SYSTOLIC BLOOD PRESSURE: 132 MMHG
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
## 2019-04-12 DIAGNOSIS — S80.01XA CONTUSION OF RIGHT KNEE, INITIAL ENCOUNTER: Primary | ICD-10-CM

## 2019-04-12 PROCEDURE — 99283 EMERGENCY DEPT VISIT LOW MDM: CPT | Mod: 25,ER

## 2019-04-13 NOTE — ED PROVIDER NOTES
Encounter Date: 2019       History     Chief Complaint   Patient presents with    Fall     Trip and fall at work this afternoon landing on right knee.  Pt able to ambulate with pain, no obvious injury/deformity/ecchymosis noted in triage     Patient is a 39 year old female who presents with right knee pain. She reports PMH significant for hypertension. She states she tripped over a box of fries approximately 7 hr prior to arrival and landed on the right knee.  She has had continued pain and swelling to the area.  She reports taking Tylenol with no improvement.  She denies previous injury. She denies any numbness or tingling.  She denies any erythema.  She has been able to weightbear with worsening pain.    The history is provided by the patient.     Review of patient's allergies indicates:  No Known Allergies  Past Medical History:   Diagnosis Date    Hypertension      Past Surgical History:   Procedure Laterality Date     SECTION      x 3    TONSILLECTOMY       History reviewed. No pertinent family history.  Social History     Tobacco Use    Smoking status: Current Every Day Smoker     Packs/day: 0.50     Types: Cigarettes    Smokeless tobacco: Never Used   Substance Use Topics    Alcohol use: Yes     Comment: rarely    Drug use: No     Review of Systems   Constitutional: Negative for activity change, appetite change, chills and fever.   HENT: Negative for congestion, rhinorrhea and sore throat.    Eyes: Negative for redness and visual disturbance.   Respiratory: Negative for cough, chest tightness and shortness of breath.    Cardiovascular: Negative for chest pain.   Gastrointestinal: Negative for abdominal pain, diarrhea, nausea and vomiting.   Genitourinary: Negative for dysuria and frequency.   Musculoskeletal: Positive for arthralgias and joint swelling. Negative for back pain, neck pain and neck stiffness.   Skin: Negative for rash.   Neurological: Negative for dizziness, syncope,  numbness and headaches.       Physical Exam     Initial Vitals [04/12/19 1912]   BP Pulse Resp Temp SpO2   137/76 70 18 98.6 °F (37 °C) 100 %      MAP       --         Physical Exam    Nursing note and vitals reviewed.  Constitutional: She appears well-developed and well-nourished. She is cooperative.  Non-toxic appearance. She does not have a sickly appearance.   HENT:   Head: Normocephalic and atraumatic.   Right Ear: External ear normal.   Left Ear: External ear normal.   Nose: Nose normal.   Eyes: Conjunctivae and lids are normal.   Neck: Normal range of motion and full passive range of motion without pain. Neck supple.   Cardiovascular: Normal rate, regular rhythm and normal heart sounds. Exam reveals no gallop and no friction rub.    No murmur heard.  Pulmonary/Chest: Breath sounds normal. She has no wheezes. She has no rhonchi. She has no rales.   Abdominal: Normal appearance.   Musculoskeletal:        Right hip: She exhibits tenderness. She exhibits normal range of motion, normal strength and no bony tenderness.        Right knee: She exhibits swelling and ecchymosis. Tenderness found. Lateral joint line tenderness noted.        Legs:  Tenderness, swelling and ecchymosis noted to the lateral joint line of the right knee. Muscular tenderness to the right thigh with ecchymosis. No bony tenderness to right hip. Able to internally and externally rotate hip. 2+ pedal pulse. Equal strength to bilateral lower extremities.    Neurological: She is alert.   Skin: Skin is warm, dry and intact. No rash noted.         ED Course   Procedures  Labs Reviewed - No data to display       Imaging Results          X-Ray Knee 1 or 2 View Right (Final result)  Result time 04/12/19 19:50:45   Procedure changed from X-Ray Knee 3 View Right     Final result by Thomas Rosas MD (04/12/19 19:50:45)                 Impression:      Negative exam.      Electronically signed by: Alfredo Rosas MD  Date:    04/12/2019  Time:    19:50              Narrative:    EXAMINATION:  XR KNEE 1 OR 2 VIEW RIGHT    CLINICAL HISTORY:  Unspecified fall, initial encounterfall;    TECHNIQUE:  Standard radiography performed.    COMPARISON:  None    FINDINGS:  Bone density and architecture are normal.  No acute findings.                                 Medical Decision Making:   Initial Assessment:   Patient is a 39 year old female who presents with right knee pain. She reports PMH significant for hypertension. She states she tripped over a box of fries approximately 7 hr prior to arrival and landed on the right knee.  She has had continued pain and swelling to the area.  She reports taking Tylenol with no improvement.  She denies previous injury. She denies any numbness or tingling.  She denies any erythema.  She has been able to weightbear with worsening pain.  Differential Diagnosis:   Acute fracture  Contusion  Abrasion  Clinical Tests:   Radiological Study: Ordered and Reviewed  ED Management:  This is an emergent evaluation of a 39 year old female with complaint of knee pain. Patient reports falling onto right knee at work. Patient reports pain is worse with ambulation. Patient denied lower extremity numbness/tingling. Patient is noted to have tenderness to palpation on exam. There is no erythema or warmth. I doubt septic joint. No effusion noted. No joint laxity. Joint line tenderness. Pain with valgus and varus stress. Patient with normal strength bilaterally to lower extremities. Xray is negative. Patient placed in ACE wrap and crutches given. Discussed results with patient. Return precautions given. Patient is to follow up with their primary care provider. All questions answered.                         Clinical Impression:       ICD-10-CM ICD-9-CM   1. Contusion of right knee, initial encounter S80.01XA 924.11   2. Fall W19.XXXA E888.9                                Hilda Ahumada PA-C  04/12/19 2043

## 2019-05-22 ENCOUNTER — LAB VISIT (OUTPATIENT)
Dept: LAB | Facility: HOSPITAL | Age: 39
End: 2019-05-22
Attending: INTERNAL MEDICINE
Payer: MEDICAID

## 2019-05-22 DIAGNOSIS — I10 ESSENTIAL (PRIMARY) HYPERTENSION: Primary | ICD-10-CM

## 2019-05-22 LAB
25(OH)D3+25(OH)D2 SERPL-MCNC: 18 NG/ML (ref 30–96)
ALBUMIN SERPL BCP-MCNC: 4 G/DL (ref 3.5–5.2)
ALP SERPL-CCNC: 50 U/L (ref 38–126)
ALT SERPL W/O P-5'-P-CCNC: 13 U/L (ref 10–44)
ANION GAP SERPL CALC-SCNC: 5 MMOL/L (ref 8–16)
AST SERPL-CCNC: 22 U/L (ref 15–46)
BACTERIA #/AREA URNS AUTO: NORMAL /HPF
BASOPHILS # BLD AUTO: 0.02 K/UL (ref 0–0.2)
BASOPHILS NFR BLD: 0.5 % (ref 0–1.9)
BILIRUB SERPL-MCNC: 0.3 MG/DL (ref 0.1–1)
BILIRUB UR QL STRIP: NEGATIVE
BUN SERPL-MCNC: 13 MG/DL (ref 7–17)
CALCIUM SERPL-MCNC: 8.4 MG/DL (ref 8.7–10.5)
CHLORIDE SERPL-SCNC: 105 MMOL/L (ref 95–110)
CHOLEST SERPL-MCNC: 121 MG/DL (ref 120–199)
CHOLEST/HDLC SERPL: 2 {RATIO} (ref 2–5)
CLARITY UR REFRACT.AUTO: CLEAR
CO2 SERPL-SCNC: 28 MMOL/L (ref 23–29)
COLOR UR AUTO: YELLOW
CREAT SERPL-MCNC: 0.69 MG/DL (ref 0.5–1.4)
DIFFERENTIAL METHOD: ABNORMAL
EOSINOPHIL # BLD AUTO: 0 K/UL (ref 0–0.5)
EOSINOPHIL NFR BLD: 1 % (ref 0–8)
ERYTHROCYTE [DISTWIDTH] IN BLOOD BY AUTOMATED COUNT: 19.5 % (ref 11.5–14.5)
EST. GFR  (AFRICAN AMERICAN): >60 ML/MIN/1.73 M^2
EST. GFR  (NON AFRICAN AMERICAN): >60 ML/MIN/1.73 M^2
ESTIMATED AVG GLUCOSE: 103 MG/DL (ref 68–131)
GLUCOSE SERPL-MCNC: 88 MG/DL (ref 70–110)
GLUCOSE UR QL STRIP: NEGATIVE
HBA1C MFR BLD HPLC: 5.2 % (ref 4–5.6)
HCT VFR BLD AUTO: 30.3 % (ref 37–48.5)
HDLC SERPL-MCNC: 62 MG/DL (ref 40–75)
HDLC SERPL: 51.2 % (ref 20–50)
HGB BLD-MCNC: 9.1 G/DL (ref 12–16)
HGB UR QL STRIP: ABNORMAL
KETONES UR QL STRIP: NEGATIVE
LDLC SERPL CALC-MCNC: 53.4 MG/DL (ref 63–159)
LEUKOCYTE ESTERASE UR QL STRIP: ABNORMAL
LYMPHOCYTES # BLD AUTO: 1.2 K/UL (ref 1–4.8)
LYMPHOCYTES NFR BLD: 30.8 % (ref 18–48)
MCH RBC QN AUTO: 22.5 PG (ref 27–31)
MCHC RBC AUTO-ENTMCNC: 30 G/DL (ref 32–36)
MCV RBC AUTO: 75 FL (ref 82–98)
MICROSCOPIC COMMENT: NORMAL
MONOCYTES # BLD AUTO: 0.5 K/UL (ref 0.3–1)
MONOCYTES NFR BLD: 11.2 % (ref 4–15)
NEUTROPHILS # BLD AUTO: 2.3 K/UL (ref 1.8–7.7)
NEUTROPHILS NFR BLD: 56.5 % (ref 38–73)
NITRITE UR QL STRIP: NEGATIVE
NONHDLC SERPL-MCNC: 59 MG/DL
PH UR STRIP: 6 [PH] (ref 5–8)
PLATELET # BLD AUTO: 328 K/UL (ref 150–350)
PMV BLD AUTO: 10.4 FL (ref 9.2–12.9)
POTASSIUM SERPL-SCNC: 4.1 MMOL/L (ref 3.5–5.1)
PROT SERPL-MCNC: 7.2 G/DL (ref 6–8.4)
PROT UR QL STRIP: NEGATIVE
PTH-INTACT SERPL-MCNC: 41 PG/ML (ref 9–77)
RBC # BLD AUTO: 4.04 M/UL (ref 4–5.4)
RBC #/AREA URNS AUTO: 1 /HPF (ref 0–4)
SODIUM SERPL-SCNC: 138 MMOL/L (ref 136–145)
SP GR UR STRIP: 1.01 (ref 1–1.03)
TRIGL SERPL-MCNC: 28 MG/DL (ref 30–150)
TSH SERPL DL<=0.005 MIU/L-ACNC: 0.83 UIU/ML (ref 0.4–4)
URN SPEC COLLECT METH UR: ABNORMAL
UROBILINOGEN UR STRIP-ACNC: NEGATIVE EU/DL
WBC # BLD AUTO: 4.03 K/UL (ref 3.9–12.7)
WBC #/AREA URNS AUTO: 0 /HPF (ref 0–5)

## 2019-05-22 PROCEDURE — 86696 HERPES SIMPLEX TYPE 2 TEST: CPT | Mod: PO

## 2019-05-22 PROCEDURE — 80061 LIPID PANEL: CPT

## 2019-05-22 PROCEDURE — 83970 ASSAY OF PARATHORMONE: CPT | Mod: PO

## 2019-05-22 PROCEDURE — 85025 COMPLETE CBC W/AUTO DIFF WBC: CPT | Mod: PO

## 2019-05-22 PROCEDURE — 80053 COMPREHEN METABOLIC PANEL: CPT | Mod: PO

## 2019-05-22 PROCEDURE — 84443 ASSAY THYROID STIM HORMONE: CPT | Mod: PO

## 2019-05-22 PROCEDURE — 82306 VITAMIN D 25 HYDROXY: CPT | Mod: PO

## 2019-05-22 PROCEDURE — 36415 COLL VENOUS BLD VENIPUNCTURE: CPT | Mod: PO

## 2019-05-22 PROCEDURE — 81000 URINALYSIS NONAUTO W/SCOPE: CPT | Mod: PO

## 2019-05-22 PROCEDURE — 86703 HIV-1/HIV-2 1 RESULT ANTBDY: CPT | Mod: PO

## 2019-05-22 PROCEDURE — 83036 HEMOGLOBIN GLYCOSYLATED A1C: CPT

## 2019-05-23 LAB — HIV 1+2 AB+HIV1 P24 AG SERPL QL IA: NEGATIVE

## 2019-05-24 LAB
HSV1 IGG SERPL QL IA: POSITIVE
HSV2 IGG SERPL QL IA: POSITIVE

## 2019-05-25 ENCOUNTER — HOSPITAL ENCOUNTER (EMERGENCY)
Facility: HOSPITAL | Age: 39
Discharge: HOME | End: 2019-05-25
Attending: FAMILY MEDICINE
Payer: MEDICAID

## 2019-05-25 VITALS
WEIGHT: 144 LBS | DIASTOLIC BLOOD PRESSURE: 82 MMHG | HEIGHT: 65 IN | BODY MASS INDEX: 23.99 KG/M2 | RESPIRATION RATE: 17 BRPM | TEMPERATURE: 99 F | HEART RATE: 81 BPM | OXYGEN SATURATION: 100 % | SYSTOLIC BLOOD PRESSURE: 160 MMHG

## 2019-05-25 DIAGNOSIS — M54.12 CERVICAL RADICULOPATHY: Primary | ICD-10-CM

## 2019-05-25 PROCEDURE — 63600175 PHARM REV CODE 636 W HCPCS: Mod: ER | Performed by: NURSE PRACTITIONER

## 2019-05-25 PROCEDURE — 96372 THER/PROPH/DIAG INJ SC/IM: CPT | Mod: ER

## 2019-05-25 PROCEDURE — 25000003 PHARM REV CODE 250: Mod: ER | Performed by: FAMILY MEDICINE

## 2019-05-25 PROCEDURE — 99284 EMERGENCY DEPT VISIT MOD MDM: CPT | Mod: 25,ER

## 2019-05-25 RX ORDER — KETOROLAC TROMETHAMINE 30 MG/ML
60 INJECTION, SOLUTION INTRAMUSCULAR; INTRAVENOUS
Status: COMPLETED | OUTPATIENT
Start: 2019-05-25 | End: 2019-05-25

## 2019-05-25 RX ORDER — METHYLPREDNISOLONE 4 MG/1
TABLET ORAL
Qty: 1 PACKAGE | Refills: 0 | Status: SHIPPED | OUTPATIENT
Start: 2019-05-25 | End: 2019-06-15

## 2019-05-25 RX ORDER — CYCLOBENZAPRINE HCL 10 MG
10 TABLET ORAL ONCE
Status: COMPLETED | OUTPATIENT
Start: 2019-05-25 | End: 2019-05-25

## 2019-05-25 RX ORDER — CYCLOBENZAPRINE HCL 10 MG
10 TABLET ORAL 3 TIMES DAILY PRN
Status: DISCONTINUED | OUTPATIENT
Start: 2019-05-25 | End: 2019-05-25

## 2019-05-25 RX ADMIN — CYCLOBENZAPRINE HYDROCHLORIDE 10 MG: 10 TABLET, FILM COATED ORAL at 01:05

## 2019-05-25 RX ADMIN — KETOROLAC TROMETHAMINE 60 MG: 30 INJECTION, SOLUTION INTRAMUSCULAR at 01:05

## 2019-05-25 NOTE — ED PROVIDER NOTES
"Encounter Date: 2019       History     Chief Complaint   Patient presents with    Neck Pain     chronic neck pain, pt states she recieved a " shot" in neck 1 mth and has had increased pain since     Patient is a 39-year-old female who presents with complaint of bilateral neck pain that radiates into her back and shoulders.  She states onset of symptoms was 1 month ago after receiving an epidural injection in her neck by her back surgeon.  She states she has not seen her surgeon since but has been taking Tylenol and muscle relaxers with minimal relief.  She reports her next appointment with her surgeon is in 3 days.  No distress noted at this time.  Denies any focal neuro deficits.        Review of patient's allergies indicates:  No Known Allergies  Past Medical History:   Diagnosis Date    Hypertension      Past Surgical History:   Procedure Laterality Date     SECTION      x 3    TONSILLECTOMY       History reviewed. No pertinent family history.  Social History     Tobacco Use    Smoking status: Current Every Day Smoker     Packs/day: 0.50     Types: Cigarettes    Smokeless tobacco: Never Used   Substance Use Topics    Alcohol use: Yes     Comment: rarely    Drug use: No     Review of Systems   Constitutional: Negative for fever.   HENT: Negative for sore throat.    Respiratory: Negative for shortness of breath.    Cardiovascular: Negative for chest pain.   Gastrointestinal: Negative for nausea.   Genitourinary: Negative for dysuria.   Musculoskeletal: Positive for back pain, neck pain and neck stiffness.   Skin: Negative for rash.   Neurological: Negative for weakness.   Hematological: Does not bruise/bleed easily.   All other systems reviewed and are negative.      Physical Exam     Initial Vitals [19 1244]   BP Pulse Resp Temp SpO2   (!) 160/82 81 17 98.8 °F (37.1 °C) 100 %      MAP       --         Physical Exam    Nursing note and vitals reviewed.  Constitutional: She appears " well-developed and well-nourished.   HENT:   Head: Normocephalic and atraumatic.   Eyes: EOM are normal. Pupils are equal, round, and reactive to light.   Neck: Normal range of motion.   Bilateral cervical radiculopathy, no vertebral tenderness. No step-offs.   Cardiovascular: Normal rate, regular rhythm, normal heart sounds and intact distal pulses.   Pulmonary/Chest: Breath sounds normal.   Abdominal: Soft. Bowel sounds are normal.   Musculoskeletal: Normal range of motion.   Neurological: She is alert and oriented to person, place, and time. She has normal strength and normal reflexes.   Skin: Skin is warm and dry.         ED Course   Procedures  Labs Reviewed - No data to display       Imaging Results    None          Medical Decision Making:   ED Management:  Patient instructed to take medications as prescribed and follow up with her surgeon at her appointment in 3 days.  Patient has no signs of distress at this time.  Patient instructed to return emergency room with any worsening symptoms. Patient verbalized understanding and agrees to plan.                      Clinical Impression:       ICD-10-CM ICD-9-CM   1. Cervical radiculopathy M54.12 723.4         Disposition:   Disposition: Discharged                        Shin Abbasi NP  05/25/19 4329       Shin Abbasi NP  05/25/19 9066

## 2019-05-25 NOTE — ED TRIAGE NOTES
"chronic neck pain, pt states she recieved a " shot" in neck 1 mth and has had increased pain since, pt walks with steady gait   "

## 2019-08-31 ENCOUNTER — HOSPITAL ENCOUNTER (EMERGENCY)
Facility: HOSPITAL | Age: 39
Discharge: HOME | End: 2019-08-31
Attending: SURGERY
Payer: MEDICAID

## 2019-08-31 VITALS
HEART RATE: 80 BPM | RESPIRATION RATE: 18 BRPM | OXYGEN SATURATION: 99 % | DIASTOLIC BLOOD PRESSURE: 78 MMHG | SYSTOLIC BLOOD PRESSURE: 148 MMHG | BODY MASS INDEX: 24.26 KG/M2 | TEMPERATURE: 99 F | HEIGHT: 65 IN | WEIGHT: 145.63 LBS

## 2019-08-31 DIAGNOSIS — G44.229 CHRONIC TENSION-TYPE HEADACHE, NOT INTRACTABLE: Primary | ICD-10-CM

## 2019-08-31 DIAGNOSIS — M50.90 CERVICAL DISC DISEASE: ICD-10-CM

## 2019-08-31 PROCEDURE — 63600175 PHARM REV CODE 636 W HCPCS: Mod: ER | Performed by: SURGERY

## 2019-08-31 PROCEDURE — 99284 EMERGENCY DEPT VISIT MOD MDM: CPT | Mod: 25,ER

## 2019-08-31 PROCEDURE — 25000003 PHARM REV CODE 250: Mod: ER | Performed by: SURGERY

## 2019-08-31 PROCEDURE — 96372 THER/PROPH/DIAG INJ SC/IM: CPT | Mod: ER

## 2019-08-31 RX ORDER — BUTALBITAL, ACETAMINOPHEN AND CAFFEINE 50; 325; 40 MG/1; MG/1; MG/1
1 TABLET ORAL EVERY 4 HOURS PRN
Qty: 20 TABLET | Refills: 0 | Status: SHIPPED | OUTPATIENT
Start: 2019-08-31 | End: 2019-09-30

## 2019-08-31 RX ORDER — OXYCODONE AND ACETAMINOPHEN 5; 325 MG/1; MG/1
2 TABLET ORAL
Status: COMPLETED | OUTPATIENT
Start: 2019-08-31 | End: 2019-08-31

## 2019-08-31 RX ORDER — KETOROLAC TROMETHAMINE 30 MG/ML
30 INJECTION, SOLUTION INTRAMUSCULAR; INTRAVENOUS
Status: COMPLETED | OUTPATIENT
Start: 2019-08-31 | End: 2019-08-31

## 2019-08-31 RX ADMIN — KETOROLAC TROMETHAMINE 30 MG: 30 INJECTION, SOLUTION INTRAMUSCULAR at 09:08

## 2019-08-31 RX ADMIN — OXYCODONE AND ACETAMINOPHEN 2 TABLET: 5; 325 TABLET ORAL at 09:08

## 2019-08-31 NOTE — ED PROVIDER NOTES
Encounter Date: 2019       History     Chief Complaint   Patient presents with    Headache     Pt with c/o headache x6 weeks. Pt reprots OTC medication with no relief. Pt denies any other complaints.      Patient complains of a headache for 6 weeks patient does not have a primary doctor she uses emergency room for her medical care she has a history of hypertension and cervical disc disease    The history is provided by the patient.   Headache    This is a chronic problem. The current episode started more than 1 month ago. The problem occurs daily. The problem has been unchanged. The pain is located in the bilateral region. The pain quality is similar to prior headaches. The quality of the pain is described as aching. The pain is at a severity of 10/10. Associated symptoms include neck pain. Pertinent negatives include no photophobia or visual change.     Review of patient's allergies indicates:  No Known Allergies  Past Medical History:   Diagnosis Date    Hypertension      Past Surgical History:   Procedure Laterality Date     SECTION      x 3    TONSILLECTOMY       History reviewed. No pertinent family history.  Social History     Tobacco Use    Smoking status: Current Every Day Smoker     Packs/day: 0.25     Types: Cigarettes    Smokeless tobacco: Never Used   Substance Use Topics    Alcohol use: Yes     Comment: rarely    Drug use: No     Review of Systems   Constitutional: Negative.    HENT: Negative.    Eyes: Negative.  Negative for photophobia.   Cardiovascular: Negative.    Gastrointestinal: Negative.    Endocrine: Negative.    Genitourinary: Negative.    Musculoskeletal: Positive for neck pain.   Skin: Negative.    Allergic/Immunologic: Negative.    Neurological: Positive for headaches.   Hematological: Negative.    Psychiatric/Behavioral: Negative.        Physical Exam     Initial Vitals [19 0852]   BP Pulse Resp Temp SpO2   (!) 148/78 80 18 99 °F (37.2 °C) 99 %      MAP       --          Physical Exam    Nursing note and vitals reviewed.  Constitutional: She appears well-developed and well-nourished.   HENT:   Head: Normocephalic.   Eyes: Conjunctivae are normal.   Neck: Normal range of motion.   Cardiovascular: Normal rate and intact distal pulses.   Pulmonary/Chest: Breath sounds normal.   Abdominal: Soft.   Musculoskeletal: Normal range of motion.   Neurological: She is alert and oriented to person, place, and time. GCS score is 15. GCS eye subscore is 4. GCS verbal subscore is 5. GCS motor subscore is 6.   Skin: Capillary refill takes less than 2 seconds.   Psychiatric: She has a normal mood and affect.         ED Course   Procedures  Labs Reviewed - No data to display       Imaging Results    None          Medical Decision Making:   Initial Assessment:   Headache triggered by cervical disc disease  ED Management:  Recommend follow-up with primary doctor and addressed cervical disc disease                      Clinical Impression:       ICD-10-CM ICD-9-CM   1. Cervical disc disease M50.90 722.91   2. Chronic tension-type headache, not intractable G44.229 339.12         Disposition:   Disposition: Discharged  Condition: Stable                        ASHWINI Cueto III, MD  08/31/19 0948       ASHWINI Cueto III, MD  08/31/19 0999

## 2020-08-10 ENCOUNTER — HOSPITAL ENCOUNTER (EMERGENCY)
Facility: HOSPITAL | Age: 40
Discharge: HOME | End: 2020-08-10
Attending: EMERGENCY MEDICINE
Payer: MEDICAID

## 2020-08-10 VITALS
HEART RATE: 88 BPM | OXYGEN SATURATION: 97 % | DIASTOLIC BLOOD PRESSURE: 67 MMHG | TEMPERATURE: 98 F | SYSTOLIC BLOOD PRESSURE: 152 MMHG | RESPIRATION RATE: 18 BRPM

## 2020-08-10 DIAGNOSIS — I10 HYPERTENSION, UNSPECIFIED TYPE: ICD-10-CM

## 2020-08-10 DIAGNOSIS — G44.209 TENSION-TYPE HEADACHE, NOT INTRACTABLE, UNSPECIFIED CHRONICITY PATTERN: Primary | ICD-10-CM

## 2020-08-10 DIAGNOSIS — K52.9 GASTROENTERITIS: ICD-10-CM

## 2020-08-10 LAB
ALBUMIN SERPL BCP-MCNC: 4 G/DL (ref 3.5–5.2)
ALP SERPL-CCNC: 54 U/L (ref 38–126)
ALT SERPL W/O P-5'-P-CCNC: 28 U/L (ref 10–44)
ANION GAP SERPL CALC-SCNC: 6 MMOL/L (ref 8–16)
AST SERPL-CCNC: 53 U/L (ref 15–46)
B-HCG UR QL: NEGATIVE
BASOPHILS # BLD AUTO: 0.04 K/UL (ref 0–0.2)
BASOPHILS NFR BLD: 0.6 % (ref 0–1.9)
BILIRUB SERPL-MCNC: 0.7 MG/DL (ref 0.1–1)
BILIRUB UR QL STRIP: NEGATIVE
BUN SERPL-MCNC: 9 MG/DL (ref 7–17)
CALCIUM SERPL-MCNC: 8.7 MG/DL (ref 8.7–10.5)
CHLORIDE SERPL-SCNC: 107 MMOL/L (ref 95–110)
CLARITY UR REFRACT.AUTO: CLEAR
CO2 SERPL-SCNC: 28 MMOL/L (ref 23–29)
COLOR UR AUTO: YELLOW
CREAT SERPL-MCNC: 0.78 MG/DL (ref 0.5–1.4)
DIFFERENTIAL METHOD: ABNORMAL
EOSINOPHIL # BLD AUTO: 0.1 K/UL (ref 0–0.5)
EOSINOPHIL NFR BLD: 0.9 % (ref 0–8)
ERYTHROCYTE [DISTWIDTH] IN BLOOD BY AUTOMATED COUNT: 18.5 % (ref 11.5–14.5)
EST. GFR  (AFRICAN AMERICAN): >60 ML/MIN/1.73 M^2
EST. GFR  (NON AFRICAN AMERICAN): >60 ML/MIN/1.73 M^2
GLUCOSE SERPL-MCNC: 90 MG/DL (ref 70–110)
GLUCOSE UR QL STRIP: NEGATIVE
HCT VFR BLD AUTO: 27.5 % (ref 37–48.5)
HGB BLD-MCNC: 8.2 G/DL (ref 12–16)
HGB UR QL STRIP: NEGATIVE
IMM GRANULOCYTES # BLD AUTO: 0.02 K/UL (ref 0–0.04)
IMM GRANULOCYTES NFR BLD AUTO: 0.3 % (ref 0–0.5)
KETONES UR QL STRIP: NEGATIVE
LEUKOCYTE ESTERASE UR QL STRIP: NEGATIVE
LIPASE SERPL-CCNC: 17 U/L (ref 23–300)
LYMPHOCYTES # BLD AUTO: 1.1 K/UL (ref 1–4.8)
LYMPHOCYTES NFR BLD: 16.1 % (ref 18–48)
MCH RBC QN AUTO: 22 PG (ref 27–31)
MCHC RBC AUTO-ENTMCNC: 29.8 G/DL (ref 32–36)
MCV RBC AUTO: 74 FL (ref 82–98)
MONOCYTES # BLD AUTO: 0.7 K/UL (ref 0.3–1)
MONOCYTES NFR BLD: 10.8 % (ref 4–15)
NEUTROPHILS # BLD AUTO: 4.9 K/UL (ref 1.8–7.7)
NEUTROPHILS NFR BLD: 71.3 % (ref 38–73)
NITRITE UR QL STRIP: NEGATIVE
NRBC BLD-RTO: 0 /100 WBC
PH UR STRIP: 8 [PH] (ref 5–8)
PLATELET # BLD AUTO: 284 K/UL (ref 150–350)
PMV BLD AUTO: 10.2 FL (ref 9.2–12.9)
POTASSIUM SERPL-SCNC: 3.7 MMOL/L (ref 3.5–5.1)
PROT SERPL-MCNC: 7.1 G/DL (ref 6–8.4)
PROT UR QL STRIP: NEGATIVE
RBC # BLD AUTO: 3.73 M/UL (ref 4–5.4)
SODIUM SERPL-SCNC: 141 MMOL/L (ref 136–145)
SP GR UR STRIP: 1.01 (ref 1–1.03)
URN SPEC COLLECT METH UR: NORMAL
UROBILINOGEN UR STRIP-ACNC: NEGATIVE EU/DL
WBC # BLD AUTO: 6.85 K/UL (ref 3.9–12.7)

## 2020-08-10 PROCEDURE — 25000003 PHARM REV CODE 250: Mod: ER | Performed by: EMERGENCY MEDICINE

## 2020-08-10 PROCEDURE — 80053 COMPREHEN METABOLIC PANEL: CPT | Mod: ER

## 2020-08-10 PROCEDURE — 83690 ASSAY OF LIPASE: CPT | Mod: ER

## 2020-08-10 PROCEDURE — 81003 URINALYSIS AUTO W/O SCOPE: CPT | Mod: ER

## 2020-08-10 PROCEDURE — 85025 COMPLETE CBC W/AUTO DIFF WBC: CPT | Mod: ER

## 2020-08-10 PROCEDURE — 99284 EMERGENCY DEPT VISIT MOD MDM: CPT | Mod: ER

## 2020-08-10 PROCEDURE — 81025 URINE PREGNANCY TEST: CPT | Mod: ER

## 2020-08-10 RX ORDER — LISINOPRIL 10 MG/1
10 TABLET ORAL DAILY
Qty: 30 TABLET | Refills: 0 | OUTPATIENT
Start: 2020-08-10 | End: 2020-08-10

## 2020-08-10 RX ORDER — ONDANSETRON 4 MG/1
4 TABLET, ORALLY DISINTEGRATING ORAL
Status: COMPLETED | OUTPATIENT
Start: 2020-08-10 | End: 2020-08-10

## 2020-08-10 RX ORDER — DICYCLOMINE HYDROCHLORIDE 20 MG/1
20 TABLET ORAL 2 TIMES DAILY
Qty: 20 TABLET | Refills: 0 | Status: SHIPPED | OUTPATIENT
Start: 2020-08-10 | End: 2020-09-09

## 2020-08-10 RX ORDER — ONDANSETRON 4 MG/1
4 TABLET, ORALLY DISINTEGRATING ORAL EVERY 6 HOURS PRN
Qty: 10 TABLET | Refills: 0 | Status: SHIPPED | OUTPATIENT
Start: 2020-08-10

## 2020-08-10 RX ORDER — BUTALBITAL, ACETAMINOPHEN AND CAFFEINE 50; 325; 40 MG/1; MG/1; MG/1
1 TABLET ORAL
Status: COMPLETED | OUTPATIENT
Start: 2020-08-10 | End: 2020-08-10

## 2020-08-10 RX ADMIN — ONDANSETRON 4 MG: 4 TABLET, ORALLY DISINTEGRATING ORAL at 09:08

## 2020-08-10 RX ADMIN — BUTALBITAL, ACETAMINOPHEN, AND CAFFEINE 1 TABLET: 50; 325; 40 TABLET ORAL at 09:08

## 2020-08-10 NOTE — ED PROVIDER NOTES
Encounter Date: 8/10/2020       History     Chief Complaint   Patient presents with    Abdominal Pain     HA and top of my stomach has been hurting for 5 days and today i started with N/VD.     Nausea    Diarrhea    Headache     Patient presents for evaluation of headache and abdominal pain that have been going on for about the last 4 or 5 days.  She localizes her headache in the temporal area.  A pounding sensation.  No trauma.  No visual changes associated with it.  Also the abdominal pain is more generalized.  This morning she vomited once it was more liquid.  Does not endorse undigested food or bile.  And had 1 loose stool today.  Only surgical history that she can tell me on her abdomen is a Caesarean section x3.  Past medical history is hypertension has been off her lisinopril for approximately 1 month.    The history is provided by the patient.   Abdominal Pain  The current episode started several days ago. The onset of the illness was gradual. The problem has not changed since onset.The abdominal pain is generalized. The abdominal pain does not radiate. The abdominal pain is relieved by nothing. The abdominal pain is exacerbated by movement. The other symptoms of the illness include nausea, vomiting and diarrhea. The other symptoms of the illness do not include fever, fatigue, melena, shortness of breath, dysuria, hematemesis, hematochezia, vaginal discharge or vaginal bleeding.   The diarrhea began today. The diarrhea is semi-solid.   Nausea began today.   The patient states that she believes she is currently not pregnant. The patient has not had a change in bowel habit. Symptoms associated with the illness do not include chills, anorexia or back pain. Significant associated medical issues do not include GERD, diabetes, sickle cell disease, gallstones, diverticulitis or cardiac disease.   Nausea  Associated symptoms include abdominal pain and headaches. Pertinent negatives include no chest pain and no  shortness of breath.   Diarrhea   Associated symptoms include abdominal pain, headaches and vomiting. Pertinent negatives include no chills or fever.   Headache   Associated symptoms include abdominal pain, nausea and vomiting. Pertinent negatives include no anorexia, back pain, fever, sore throat or weakness.     Review of patient's allergies indicates:  No Known Allergies  Past Medical History:   Diagnosis Date    Hypertension      Past Surgical History:   Procedure Laterality Date     SECTION      x 3    TONSILLECTOMY       History reviewed. No pertinent family history.  Social History     Tobacco Use    Smoking status: Current Every Day Smoker     Packs/day: 0.25     Types: Cigarettes    Smokeless tobacco: Never Used   Substance Use Topics    Alcohol use: Yes     Comment: rarely    Drug use: No     Review of Systems   Constitutional: Negative for chills, fatigue and fever.   HENT: Negative for sore throat.    Respiratory: Negative for shortness of breath.    Cardiovascular: Negative for chest pain.   Gastrointestinal: Positive for abdominal pain, diarrhea, nausea and vomiting. Negative for anorexia, hematemesis, hematochezia and melena.   Genitourinary: Negative for dysuria, vaginal bleeding and vaginal discharge.   Musculoskeletal: Negative for back pain.   Skin: Negative for rash.   Neurological: Positive for headaches. Negative for weakness.   Hematological: Does not bruise/bleed easily.       Physical Exam     Initial Vitals [08/10/20 0846]   BP Pulse Resp Temp SpO2   (!) 147/76 78 16 99.1 °F (37.3 °C) 100 %      MAP       --         Physical Exam    Vitals reviewed.  Constitutional: She appears well-developed and well-nourished.   HENT:   Head: Atraumatic.   Right Ear: Hearing and tympanic membrane normal. No hemotympanum.   Left Ear: Hearing and tympanic membrane normal. No hemotympanum.   Nose: Nose normal.   Mouth/Throat: Uvula is midline, oropharynx is clear and moist and mucous membranes  are normal.   No pain on palpation over the temporal   Eyes: Conjunctivae, EOM and lids are normal. Pupils are equal, round, and reactive to light.   Neck: Trachea normal and normal range of motion. Neck supple. No spinous process tenderness present.   Cardiovascular: Regular rhythm and normal pulses.   Pulmonary/Chest: Effort normal and breath sounds normal.   Abdominal: Soft. Normal appearance and bowel sounds are normal. There is generalized abdominal tenderness. There is no rigidity, no rebound and no guarding.   Skin: Skin is warm. No rash noted.   Psychiatric: She has a normal mood and affect. Her speech is normal.         ED Course   Procedures  Labs Reviewed   CBC W/ AUTO DIFFERENTIAL - Abnormal; Notable for the following components:       Result Value    RBC 3.73 (*)     Hemoglobin 8.2 (*)     Hematocrit 27.5 (*)     Mean Corpuscular Volume 74 (*)     Mean Corpuscular Hemoglobin 22.0 (*)     Mean Corpuscular Hemoglobin Conc 29.8 (*)     RDW 18.5 (*)     Lymph% 16.1 (*)     All other components within normal limits   COMPREHENSIVE METABOLIC PANEL - Abnormal; Notable for the following components:    AST 53 (*)     Anion Gap 6 (*)     All other components within normal limits   LIPASE - Abnormal; Notable for the following components:    Lipase Result 17 (*)     All other components within normal limits   URINALYSIS, REFLEX TO URINE CULTURE    Narrative:     Specimen Source->Urine   PREGNANCY TEST, URINE RAPID    Narrative:     Specimen Source->Urine   POCT URINE PREGNANCY          Imaging Results    None          Medical Decision Making:   Differential Diagnosis:   Differential diagnosis includes but is not limited to tension headache, temporal arteritis, traumatic head injury, colitis, UTI, pancreatitis, cholecystitis  ED Management:  Patient was seen and examined.  Doubt temporal arteritis.  No visual changes or temple area pain.  More likely tension she has been under some stress.  Father was rushed to the  hospital this morning.  Lab work will be obtained as well as urinalysis.  She has no evidence of peritonitis based on the physical exam.    During the COVID pandemic personal protection equipment that I did wear included and 95 mask goggles and gloves    1038-a lot over the diagnostic review the patient.  She said that she had for your said at home.  I told her she bed continue to take that.  I will put her on Bentyl as well as Zofran.  I will refill her lisinopril.  She is to contact her primary care provider for follow-up.                                 Clinical Impression:       ICD-10-CM ICD-9-CM   1. Tension-type headache, not intractable, unspecified chronicity pattern  G44.209 339.10   2. Hypertension, unspecified type  I10 401.9   3. Gastroenteritis  K52.9 558.9         Disposition:   Disposition: Discharged  Condition: Stable     ED Disposition Condition    Discharge Stable        ED Prescriptions     Medication Sig Dispense Start Date End Date Auth. Provider    lisinopriL 10 MG tablet Take 1 tablet (10 mg total) by mouth once daily. 30 tablet 8/10/2020 8/10/2021 Hiro Mosley,     dicyclomine (BENTYL) 20 mg tablet Take 1 tablet (20 mg total) by mouth 2 (two) times daily. 20 tablet 8/10/2020 9/9/2020 Hiro Mosley DO    ondansetron (ZOFRAN-ODT) 4 MG TbDL Take 1 tablet (4 mg total) by mouth every 6 (six) hours as needed. 10 tablet 8/10/2020  Hiro Mosley DO        Follow-up Information     Follow up With Specialties Details Why Contact Info    Amada Cooper MD Internal Medicine Schedule an appointment as soon as possible for a visit   [address] [place] [address]  [address]  [facility] [id number]  [phone]                                       Hiro Mosley DO  08/10/20 8074

## 2020-09-26 ENCOUNTER — HOSPITAL ENCOUNTER (EMERGENCY)
Facility: HOSPITAL | Age: 40
Discharge: HOME | End: 2020-09-26
Attending: FAMILY MEDICINE
Payer: MEDICAID

## 2020-09-26 VITALS
HEIGHT: 65 IN | HEART RATE: 84 BPM | WEIGHT: 146 LBS | DIASTOLIC BLOOD PRESSURE: 73 MMHG | TEMPERATURE: 99 F | OXYGEN SATURATION: 100 % | SYSTOLIC BLOOD PRESSURE: 123 MMHG | BODY MASS INDEX: 24.32 KG/M2 | RESPIRATION RATE: 18 BRPM

## 2020-09-26 DIAGNOSIS — B34.9 VIRAL SYNDROME: Primary | ICD-10-CM

## 2020-09-26 DIAGNOSIS — R06.02 SHORTNESS OF BREATH: ICD-10-CM

## 2020-09-26 DIAGNOSIS — H66.002 NON-RECURRENT ACUTE SUPPURATIVE OTITIS MEDIA OF LEFT EAR WITHOUT SPONTANEOUS RUPTURE OF TYMPANIC MEMBRANE: ICD-10-CM

## 2020-09-26 DIAGNOSIS — R05.9 COUGH: ICD-10-CM

## 2020-09-26 LAB
ALBUMIN SERPL BCP-MCNC: 4.4 G/DL (ref 3.5–5.2)
ALP SERPL-CCNC: 57 U/L (ref 38–126)
ALT SERPL W/O P-5'-P-CCNC: 15 U/L (ref 10–44)
ANION GAP SERPL CALC-SCNC: 7 MMOL/L (ref 8–16)
AST SERPL-CCNC: 28 U/L (ref 15–46)
B-HCG UR QL: NEGATIVE
BASOPHILS # BLD AUTO: 0.02 K/UL (ref 0–0.2)
BASOPHILS NFR BLD: 0.3 % (ref 0–1.9)
BILIRUB SERPL-MCNC: 0.5 MG/DL (ref 0.1–1)
BILIRUB UR QL STRIP: NEGATIVE
BUN SERPL-MCNC: 8 MG/DL (ref 7–17)
CALCIUM SERPL-MCNC: 8.9 MG/DL (ref 8.7–10.5)
CHLORIDE SERPL-SCNC: 106 MMOL/L (ref 95–110)
CLARITY UR REFRACT.AUTO: CLEAR
CO2 SERPL-SCNC: 26 MMOL/L (ref 23–29)
COLOR UR AUTO: YELLOW
CREAT SERPL-MCNC: 0.8 MG/DL (ref 0.5–1.4)
DIFFERENTIAL METHOD: ABNORMAL
EOSINOPHIL # BLD AUTO: 0.1 K/UL (ref 0–0.5)
EOSINOPHIL NFR BLD: 0.9 % (ref 0–8)
ERYTHROCYTE [DISTWIDTH] IN BLOOD BY AUTOMATED COUNT: 19.4 % (ref 11.5–14.5)
EST. GFR  (AFRICAN AMERICAN): >60 ML/MIN/1.73 M^2
EST. GFR  (NON AFRICAN AMERICAN): >60 ML/MIN/1.73 M^2
GLUCOSE SERPL-MCNC: 98 MG/DL (ref 70–110)
GLUCOSE UR QL STRIP: NEGATIVE
HCT VFR BLD AUTO: 30.2 % (ref 37–48.5)
HGB BLD-MCNC: 8.9 G/DL (ref 12–16)
HGB UR QL STRIP: ABNORMAL
IMM GRANULOCYTES # BLD AUTO: 0.02 K/UL (ref 0–0.04)
IMM GRANULOCYTES NFR BLD AUTO: 0.3 % (ref 0–0.5)
INFLUENZA A, MOLECULAR: NEGATIVE
INFLUENZA B, MOLECULAR: NEGATIVE
KETONES UR QL STRIP: NEGATIVE
LEUKOCYTE ESTERASE UR QL STRIP: NEGATIVE
LIPASE SERPL-CCNC: 51 U/L (ref 23–300)
LYMPHOCYTES # BLD AUTO: 1.4 K/UL (ref 1–4.8)
LYMPHOCYTES NFR BLD: 24 % (ref 18–48)
MCH RBC QN AUTO: 21.7 PG (ref 27–31)
MCHC RBC AUTO-ENTMCNC: 29.5 G/DL (ref 32–36)
MCV RBC AUTO: 74 FL (ref 82–98)
MONOCYTES # BLD AUTO: 0.6 K/UL (ref 0.3–1)
MONOCYTES NFR BLD: 9.6 % (ref 4–15)
NEUTROPHILS # BLD AUTO: 3.7 K/UL (ref 1.8–7.7)
NEUTROPHILS NFR BLD: 64.9 % (ref 38–73)
NITRITE UR QL STRIP: NEGATIVE
NRBC BLD-RTO: 0 /100 WBC
NT-PROBNP: 106 PG/ML (ref 5–450)
PH UR STRIP: 6 [PH] (ref 5–8)
PLATELET # BLD AUTO: 300 K/UL (ref 150–350)
PMV BLD AUTO: 9.5 FL (ref 9.2–12.9)
POTASSIUM SERPL-SCNC: 3.4 MMOL/L (ref 3.5–5.1)
PROT SERPL-MCNC: 7.8 G/DL (ref 6–8.4)
PROT UR QL STRIP: NEGATIVE
RBC # BLD AUTO: 4.11 M/UL (ref 4–5.4)
SODIUM SERPL-SCNC: 139 MMOL/L (ref 136–145)
SP GR UR STRIP: 1.01 (ref 1–1.03)
SPECIMEN SOURCE: NORMAL
TROPONIN I SERPL DL<=0.01 NG/ML-MCNC: <0.012 NG/ML (ref 0.01–0.03)
URN SPEC COLLECT METH UR: ABNORMAL
UROBILINOGEN UR STRIP-ACNC: NEGATIVE EU/DL
WBC # BLD AUTO: 5.74 K/UL (ref 3.9–12.7)

## 2020-09-26 PROCEDURE — 80053 COMPREHEN METABOLIC PANEL: CPT | Mod: ER

## 2020-09-26 PROCEDURE — 83880 ASSAY OF NATRIURETIC PEPTIDE: CPT | Mod: ER

## 2020-09-26 PROCEDURE — 87502 INFLUENZA DNA AMP PROBE: CPT | Mod: ER

## 2020-09-26 PROCEDURE — 93010 EKG 12-LEAD: ICD-10-PCS | Mod: ,,, | Performed by: INTERNAL MEDICINE

## 2020-09-26 PROCEDURE — 99285 EMERGENCY DEPT VISIT HI MDM: CPT | Mod: 25,ER

## 2020-09-26 PROCEDURE — 85025 COMPLETE CBC W/AUTO DIFF WBC: CPT | Mod: ER

## 2020-09-26 PROCEDURE — 81003 URINALYSIS AUTO W/O SCOPE: CPT | Mod: ER

## 2020-09-26 PROCEDURE — 83690 ASSAY OF LIPASE: CPT | Mod: ER

## 2020-09-26 PROCEDURE — 96374 THER/PROPH/DIAG INJ IV PUSH: CPT | Mod: ER

## 2020-09-26 PROCEDURE — 81025 URINE PREGNANCY TEST: CPT | Mod: ER

## 2020-09-26 PROCEDURE — 84484 ASSAY OF TROPONIN QUANT: CPT | Mod: ER

## 2020-09-26 PROCEDURE — 93010 ELECTROCARDIOGRAM REPORT: CPT | Mod: ,,, | Performed by: INTERNAL MEDICINE

## 2020-09-26 PROCEDURE — U0003 INFECTIOUS AGENT DETECTION BY NUCLEIC ACID (DNA OR RNA); SEVERE ACUTE RESPIRATORY SYNDROME CORONAVIRUS 2 (SARS-COV-2) (CORONAVIRUS DISEASE [COVID-19]), AMPLIFIED PROBE TECHNIQUE, MAKING USE OF HIGH THROUGHPUT TECHNOLOGIES AS DESCRIBED BY CMS-2020-01-R: HCPCS | Mod: ER

## 2020-09-26 PROCEDURE — 63600175 PHARM REV CODE 636 W HCPCS: Mod: ER | Performed by: PHYSICIAN ASSISTANT

## 2020-09-26 PROCEDURE — 93005 ELECTROCARDIOGRAM TRACING: CPT | Mod: ER

## 2020-09-26 RX ORDER — AMOXICILLIN 875 MG/1
875 TABLET, FILM COATED ORAL 2 TIMES DAILY
Qty: 14 TABLET | Refills: 0 | Status: SHIPPED | OUTPATIENT
Start: 2020-09-26

## 2020-09-26 RX ORDER — ONDANSETRON 2 MG/ML
4 INJECTION INTRAMUSCULAR; INTRAVENOUS
Status: COMPLETED | OUTPATIENT
Start: 2020-09-26 | End: 2020-09-26

## 2020-09-26 RX ORDER — ONDANSETRON 4 MG/1
4 TABLET, FILM COATED ORAL EVERY 12 HOURS PRN
Qty: 12 TABLET | Refills: 0 | Status: SHIPPED | OUTPATIENT
Start: 2020-09-26

## 2020-09-26 RX ADMIN — ONDANSETRON 4 MG: 2 INJECTION INTRAMUSCULAR; INTRAVENOUS at 02:09

## 2020-09-26 NOTE — ED PROVIDER NOTES
Encounter Date: 2020       History     Chief Complaint   Patient presents with    COVID-19 Concerns     Pt with c/o cough, headache, diarrhea, bodyaches, sore throat and fever x 2 weeks.      40-year-old female presents to the emergency department for evaluation of 2 week history of frontal headache, intermittently productive cough, generalized body aches, nasal congestion, nausea, vomiting, diarrhea and intermittent fever.  She reports that the symptoms began gradually 10-14 days ago and have been intermittent since onset.  She reports that she has not had any vomiting over the last 4 days, but does report mild nausea and intermittent diarrhea.  She reports 1 episode of nonbloody diarrhea today.  She reports that the abdominal pain is mild intermittent abdominal cramping.  She denies any current abdominal pain.  She denies any known sick contacts, but does report that she has been around people.  She denies any dizziness, vision changes, sore throat, neck pain, neck stiffness, chest pain, palpitations, current abdominal pain, flank pain or dysuria.  She does report mild shortness of breath with coughing.  She also reports significant bilateral ear pain, with the left being worse than the right.  She denies any ear drainage.        Review of patient's allergies indicates:  No Known Allergies  Past Medical History:   Diagnosis Date    Hypertension      Past Surgical History:   Procedure Laterality Date     SECTION      x 3    TONSILLECTOMY       History reviewed. No pertinent family history.  Social History     Tobacco Use    Smoking status: Current Every Day Smoker     Packs/day: 0.25     Types: Cigarettes    Smokeless tobacco: Never Used   Substance Use Topics    Alcohol use: Yes     Comment: rarely    Drug use: No     Review of Systems   Constitutional: Positive for chills and fatigue. Negative for activity change, appetite change and fever.   HENT: Positive for congestion. Negative for  nosebleeds, rhinorrhea, sinus pain, sore throat and trouble swallowing.    Eyes: Negative for photophobia and visual disturbance.   Respiratory: Positive for cough and shortness of breath. Negative for chest tightness and wheezing.    Cardiovascular: Negative for chest pain, palpitations and leg swelling.   Gastrointestinal: Positive for abdominal pain, diarrhea, nausea and vomiting. Negative for constipation.   Genitourinary: Negative for decreased urine volume, dysuria and flank pain.   Musculoskeletal: Negative for back pain, joint swelling, neck pain and neck stiffness.   Skin: Negative for rash.   Neurological: Positive for headaches. Negative for dizziness, syncope, weakness, light-headedness and numbness.       Physical Exam     Initial Vitals [09/26/20 1332]   BP Pulse Resp Temp SpO2   132/77 84 18 98.5 °F (36.9 °C) 100 %      MAP       --         Physical Exam    Nursing note and vitals reviewed.  Constitutional: She appears well-developed and well-nourished. She is not diaphoretic. No distress.   HENT:   Head: Normocephalic and atraumatic.   Right Ear: Tympanic membrane, external ear and ear canal normal.   Left Ear: External ear and ear canal normal. Tympanic membrane is erythematous and bulging. Tympanic membrane is not perforated.   Nose: Nose normal.   Mouth/Throat: Uvula is midline and oropharynx is clear and moist. No oropharyngeal exudate, posterior oropharyngeal edema or posterior oropharyngeal erythema.   Eyes: Conjunctivae and EOM are normal. Pupils are equal, round, and reactive to light.   Neck: Normal range of motion. Neck supple.   Cardiovascular: Normal rate, regular rhythm and normal heart sounds.   Pulmonary/Chest: Breath sounds normal. No respiratory distress. She has no wheezes. She has no rhonchi. She has no rales. She exhibits no tenderness.   Abdominal: Soft. Bowel sounds are normal. She exhibits no distension. There is no abdominal tenderness. There is no rebound, no CVA tenderness,  no tenderness at McBurney's point and negative Crawley's sign.   Lymphadenopathy:     She has no cervical adenopathy.   Neurological: She is alert and oriented to person, place, and time. No cranial nerve deficit.   Skin: Skin is warm and dry.   Psychiatric: She has a normal mood and affect.         ED Course   Procedures  Labs Reviewed   CBC W/ AUTO DIFFERENTIAL - Abnormal; Notable for the following components:       Result Value    Hemoglobin 8.9 (*)     Hematocrit 30.2 (*)     Mean Corpuscular Volume 74 (*)     Mean Corpuscular Hemoglobin 21.7 (*)     Mean Corpuscular Hemoglobin Conc 29.5 (*)     RDW 19.4 (*)     All other components within normal limits   COMPREHENSIVE METABOLIC PANEL - Abnormal; Notable for the following components:    Potassium 3.4 (*)     Anion Gap 7 (*)     All other components within normal limits   URINALYSIS, REFLEX TO URINE CULTURE - Abnormal; Notable for the following components:    Occult Blood UA Trace (*)     All other components within normal limits    Narrative:     Specimen Source->Urine   INFLUENZA A & B BY MOLECULAR   INFLUENZA A & B BY MOLECULAR   PREGNANCY TEST, URINE RAPID    Narrative:     Specimen Source->Urine   LIPASE   TROPONIN I   NT-PRO NATRIURETIC PEPTIDE   SARS-COV-2 (COVID-19) QUALITATIVE PCR     EKG Readings: (Independently Interpreted)   Initial Reading: No STEMI. Rhythm: Normal Sinus Rhythm. Heart Rate: 82.       Imaging Results          X-Ray Chest AP Portable (Final result)  Result time 09/26/20 14:27:26    Final result by Mario Simon MD (09/26/20 14:27:26)                 Impression:      No acute findings.      Electronically signed by: Mario Simon MD  Date:    09/26/2020  Time:    14:27             Narrative:    EXAMINATION:  XR CHEST AP PORTABLE    CLINICAL HISTORY:  Cough    TECHNIQUE:  Single frontal view of the chest was performed.    COMPARISON:  01/26/2018    FINDINGS:  The cardiomediastinal silhouette is normal.    The lungs are clear.  No pleural  effusions.    Bones are unremarkable.                                 Medical Decision Making:   Initial Assessment:   40-year-old female presents to the emergency department for evaluation of 10 day history of intermittent frontal headache, cough, generalized body aches, diarrhea, cramping abdominal pain, intermittent nausea, and subjective fever.  Physical exam reveals a nontoxic-appearing female in no acute distress.  Patient is afebrile vital signs within normal limits.  Neurological exam reveals an alert and oriented patient.  No cranial nerve deficits noted.  Neck is supple, no meningeal signs noted.  Left TM is erythematous and bulging.  No perforation noted.  No tenderness to palpation noted over the pinna, tragus or mastoid bilaterally.  Lungs clear to auscultation bilaterally.  No respiratory distress or accessory muscle use noted.  Abdominal exam reveals soft abdomen, nontender to palpation.  No CVA tenderness noted.  Differential Diagnosis:   COVID-19  Viral syndrome  Influenza  Chest x-ray ordered to assess possible pneumonia or consolidation  CHF  Dehydration  Left-sided otitis media  ED Management:  Influenza negative.  Urinalysis reveals no evidence of urinary tract infection.  UPT negative.  Patient given Zofran with relief of nausea.  CBC reveals no acute leukocytosis and a chronic anemia.  Hemoglobin 8.9 and hematocrit 30.2.  CMP results within normal limits.  Lipase fifty-one.  Troponin less than 0.012.  .  Chest x-ray report reveals no acute findings.  Upon re-evaluation patient reports symptoms have much improved.  Cover the patient with amoxicillin for left-sided ear infection.  Upon re-evaluation patient reports symptoms have resolved.  I discussed these findings at length with the patient verbalizes understanding and agreement course of treatment.  I discussed this patient at length with  who is in agreement course of treatment.                             Clinical  Impression:       ICD-10-CM ICD-9-CM   1. Viral syndrome  B34.9 079.99   2. Cough  R05 786.2   3. Shortness of breath  R06.02 786.05   4. Non-recurrent acute suppurative otitis media of left ear without spontaneous rupture of tympanic membrane  H66.002 382.00                          ED Disposition Condition    Discharge Stable        ED Prescriptions     Medication Sig Dispense Start Date End Date Auth. Provider    amoxicillin (AMOXIL) 875 MG tablet Take 1 tablet (875 mg total) by mouth 2 (two) times daily. 14 tablet 9/26/2020  Alyssa Russell PA-C    ondansetron (ZOFRAN) 4 MG tablet Take 1 tablet (4 mg total) by mouth every 12 (twelve) hours as needed for Nausea. 12 tablet 9/26/2020  Alyssa Russell PA-C        Follow-up Information    None                                      Alyssa Russell PA-C  09/26/20 1997       Alyssa Russell PA-C  09/26/20 3307

## 2020-09-26 NOTE — DISCHARGE INSTRUCTIONS
Your influenza test was negative.  Your COVID test is pending.   You are advised to follow-up with her primary care provider for re-evaluation and to return to the emergency department immediately for any new or worsening symptoms.

## 2020-09-27 LAB — SARS-COV-2 RNA RESP QL NAA+PROBE: NOT DETECTED

## 2020-12-15 ENCOUNTER — HOSPITAL ENCOUNTER (EMERGENCY)
Facility: HOSPITAL | Age: 40
Discharge: HOME | End: 2020-12-15
Attending: EMERGENCY MEDICINE
Payer: MEDICAID

## 2020-12-15 VITALS
HEART RATE: 76 BPM | TEMPERATURE: 99 F | BODY MASS INDEX: 24.59 KG/M2 | SYSTOLIC BLOOD PRESSURE: 175 MMHG | RESPIRATION RATE: 20 BRPM | HEIGHT: 64 IN | OXYGEN SATURATION: 94 % | DIASTOLIC BLOOD PRESSURE: 91 MMHG | WEIGHT: 144 LBS

## 2020-12-15 DIAGNOSIS — S16.1XXA CERVICAL STRAIN, ACUTE, INITIAL ENCOUNTER: Primary | ICD-10-CM

## 2020-12-15 PROCEDURE — 96372 THER/PROPH/DIAG INJ SC/IM: CPT | Mod: ER

## 2020-12-15 PROCEDURE — 99284 EMERGENCY DEPT VISIT MOD MDM: CPT | Mod: 25,ER

## 2020-12-15 PROCEDURE — 25000003 PHARM REV CODE 250: Mod: ER | Performed by: EMERGENCY MEDICINE

## 2020-12-15 PROCEDURE — 63600175 PHARM REV CODE 636 W HCPCS: Mod: ER | Performed by: EMERGENCY MEDICINE

## 2020-12-15 RX ORDER — DEXAMETHASONE SODIUM PHOSPHATE 4 MG/ML
8 INJECTION, SOLUTION INTRA-ARTICULAR; INTRALESIONAL; INTRAMUSCULAR; INTRAVENOUS; SOFT TISSUE
Status: COMPLETED | OUTPATIENT
Start: 2020-12-15 | End: 2020-12-15

## 2020-12-15 RX ORDER — ACETAMINOPHEN AND CODEINE PHOSPHATE 300; 30 MG/1; MG/1
1 TABLET ORAL EVERY 6 HOURS PRN
Qty: 7 TABLET | Refills: 0 | Status: SHIPPED | OUTPATIENT
Start: 2020-12-15 | End: 2020-12-25

## 2020-12-15 RX ORDER — TRAMADOL HYDROCHLORIDE 50 MG/1
50 TABLET ORAL
Status: COMPLETED | OUTPATIENT
Start: 2020-12-15 | End: 2020-12-15

## 2020-12-15 RX ORDER — METHOCARBAMOL 500 MG/1
1000 TABLET, FILM COATED ORAL 3 TIMES DAILY
Qty: 15 TABLET | Refills: 0 | Status: SHIPPED | OUTPATIENT
Start: 2020-12-15 | End: 2020-12-18

## 2020-12-15 RX ORDER — KETOROLAC TROMETHAMINE 30 MG/ML
60 INJECTION, SOLUTION INTRAMUSCULAR; INTRAVENOUS
Status: COMPLETED | OUTPATIENT
Start: 2020-12-15 | End: 2020-12-15

## 2020-12-15 RX ADMIN — DEXAMETHASONE SODIUM PHOSPHATE 8 MG: 4 INJECTION, SOLUTION INTRAMUSCULAR; INTRAVENOUS at 09:12

## 2020-12-15 RX ADMIN — KETOROLAC TROMETHAMINE 60 MG: 30 INJECTION, SOLUTION INTRAMUSCULAR at 09:12

## 2020-12-15 RX ADMIN — TRAMADOL HYDROCHLORIDE 50 MG: 50 TABLET, COATED ORAL at 09:12

## 2020-12-15 NOTE — ED PROVIDER NOTES
Chief Complaint  Chief Complaint   Patient presents with    Neck Pain     Pt reports posterior neck pain x3 weeks. Pt denies injury.        HPI  Danyell Whelan is a 40 y.o. female who presents with neck pain for 3 weeks.  Patient denies any injury.  She woke up with the discomfort.  It hurts to look to the right.  Her neck is stiff.  Pain is moderate and exacerbated by touch or movement of the head.  No numbness or tingling.  No bowel or bladder troubles.  No trauma.  No fever vomiting or diarrhea.  She does report occasionally that her finger tips get bluish in discoloration and she does have some occasional bilateral numbness to the finger tips.  No weakness.    Past medical history  Past Medical History:   Diagnosis Date    Hypertension        Current Medications  No current facility-administered medications for this encounter.     Current Outpatient Medications:     acetaminophen-codeine 300-30mg (TYLENOL #3) 300-30 mg Tab, Take 1 tablet by mouth every 6 (six) hours as needed., Disp: 7 tablet, Rfl: 0    amoxicillin (AMOXIL) 875 MG tablet, Take 1 tablet (875 mg total) by mouth 2 (two) times daily., Disp: 14 tablet, Rfl: 0    methocarbamoL (ROBAXIN) 500 MG Tab, Take 2 tablets (1,000 mg total) by mouth 3 (three) times daily. for 3 days, Disp: 15 tablet, Rfl: 0    ondansetron (ZOFRAN) 4 MG tablet, Take 1 tablet (4 mg total) by mouth every 12 (twelve) hours as needed for Nausea., Disp: 12 tablet, Rfl: 0    ondansetron (ZOFRAN-ODT) 4 MG TbDL, Take 1 tablet (4 mg total) by mouth every 6 (six) hours as needed., Disp: 10 tablet, Rfl: 0    Allergies  Review of patient's allergies indicates:  No Known Allergies    Surgical history  Past Surgical History:   Procedure Laterality Date     SECTION      x 3    TONSILLECTOMY         Social history  Social History     Socioeconomic History    Marital status: Single     Spouse name: Not on file    Number of children: Not on file    Years of education: Not on file  "   Highest education level: Not on file   Occupational History    Not on file   Social Needs    Financial resource strain: Not on file    Food insecurity     Worry: Not on file     Inability: Not on file    Transportation needs     Medical: Not on file     Non-medical: Not on file   Tobacco Use    Smoking status: Current Every Day Smoker     Packs/day: 0.25     Types: Cigarettes    Smokeless tobacco: Never Used   Substance and Sexual Activity    Alcohol use: Yes     Comment: rarely    Drug use: No    Sexual activity: Not on file   Lifestyle    Physical activity     Days per week: Not on file     Minutes per session: Not on file    Stress: Not on file   Relationships    Social connections     Talks on phone: Not on file     Gets together: Not on file     Attends Oriental orthodox service: Not on file     Active member of club or organization: Not on file     Attends meetings of clubs or organizations: Not on file     Relationship status: Not on file   Other Topics Concern    Not on file   Social History Narrative    Not on file       Family History  History reviewed. No pertinent family history.    Review of systems  : No dysuria or discharge.  All systems otherwise negative except as noted in ROS and HPI    Physical Exam  Vital signs: BP (!) 175/91   Pulse 76   Temp 99.2 °F (37.3 °C) (Oral)   Resp 20   Ht 5' 4" (1.626 m)   Wt 65.3 kg (144 lb)   SpO2 (!) 94%   Breastfeeding No   BMI 24.72 kg/m²   Constitutional: No acute distress.  Well developed, alert, oriented and appropriate.  HENT: Normocephalic, atraumatic. Normal ear, nose, and throat.  Eyes: PERRL, EOMI, normal conjunctiva.  Neck:  Decreased range of motion looking right .  Mild paraspinal neck muscular tenderness to palpation.  No bony deformity or tenderness or step-off  Respiratory: Nonlabored breathing with normal breath sounds.  Cardiovascular: RRR with no pulse deficit.  GI: Soft, nontender, no rebound or guarding.  Musculoskeletal: " Normal ROM, no tenderness, injury, or edema.  Skin: Warm, dry skin without infection or injury.  Neurologic: Normal motor, sensation with no new focal deficit.  Psychiatric: Affect normal, judgement normal, mood normal.  No SI, HI, and not gravely disabled.    Labs  Pertinent labs reviewed (see chart for details)  Labs Reviewed - No data to display    ECG  Results for orders placed or performed during the hospital encounter of 09/26/20   EKG 12-lead    Collection Time: 09/26/20  1:50 PM    Narrative    Test Reason : R06.02,    Vent. Rate : 082 BPM     Atrial Rate : 082 BPM     P-R Int : 138 ms          QRS Dur : 090 ms      QT Int : 382 ms       P-R-T Axes : 089 090 083 degrees     QTc Int : 446 ms    Normal sinus rhythm  Rightward axis  Pulmonary disease pattern  T wave abnormality, consider anterior ischemia  Abnormal ECG  When compared with ECG of 30-NOV-2017 17:16,  No significant change was found  Confirmed by Ciro TURNER MD, Hang BORGES ([id number]) on 9/28/2020 9:11:08 AM    Referred By: [email or web address]   SELF           Confirmed By:Hang Tanner III, MD     ECG interpreted by ED MD    Radiology  No orders to display       Procedures    Procedures    Medications   ketorolac injection 60 mg (60 mg Intramuscular Given 12/15/20 0920)   dexamethasone injection 8 mg (8 mg Intramuscular Given 12/15/20 0920)   traMADoL tablet 50 mg (50 mg Oral Given 12/15/20 0920)       ED course           Medical Decision Making:    History:  Old medical records:  I decided to obtain old medical records.  Old records summarized:  Seen a couple months ago in the ER    Initial assessment:  40-year-old female with nontraumatic neck pain    Differential diagnosis:  Spinal stenosis, cervical strain, fracture, meningitis    Clinical tests:   Labs:  Ordered and reviewed    ED management:  Will give supportive medicines here and prescriptions as well.  Patient encouraged to follow up with primary doctor for continued  evaluation      Disposition    Patient discharged in stable condition      Final impression  1. Cervical strain, acute, initial encounter        Critical care time spent with this patient was 0 minutes excluding the procedure time.          Harley Mead MD  12/15/20 1049

## 2021-08-05 ENCOUNTER — HOSPITAL ENCOUNTER (EMERGENCY)
Facility: HOSPITAL | Age: 41
Discharge: HOME | End: 2021-08-05
Attending: EMERGENCY MEDICINE
Payer: MEDICAID

## 2021-08-05 VITALS
DIASTOLIC BLOOD PRESSURE: 88 MMHG | TEMPERATURE: 99 F | RESPIRATION RATE: 18 BRPM | BODY MASS INDEX: 25.65 KG/M2 | HEIGHT: 64 IN | SYSTOLIC BLOOD PRESSURE: 171 MMHG | WEIGHT: 150.25 LBS | OXYGEN SATURATION: 100 % | HEART RATE: 85 BPM

## 2021-08-05 DIAGNOSIS — Z20.822 CLOSE EXPOSURE TO COVID-19 VIRUS: Primary | ICD-10-CM

## 2021-08-05 LAB
B-HCG UR QL: NEGATIVE
CTP QC/QA: YES
SARS-COV-2 RDRP RESP QL NAA+PROBE: NEGATIVE

## 2021-08-05 PROCEDURE — 81025 URINE PREGNANCY TEST: CPT | Mod: ER | Performed by: PHYSICIAN ASSISTANT

## 2021-08-05 PROCEDURE — U0002 COVID-19 LAB TEST NON-CDC: HCPCS | Mod: ER | Performed by: PHYSICIAN ASSISTANT

## 2021-08-05 PROCEDURE — 99282 EMERGENCY DEPT VISIT SF MDM: CPT | Mod: 25,ER

## 2024-11-22 ENCOUNTER — HOSPITAL ENCOUNTER (OUTPATIENT)
Facility: HOSPITAL | Age: 44
Discharge: HOME | End: 2024-11-23
Attending: FAMILY MEDICINE | Admitting: INTERNAL MEDICINE
Payer: MEDICAID

## 2024-11-22 DIAGNOSIS — R07.9 CHEST PAIN: ICD-10-CM

## 2024-11-22 DIAGNOSIS — R51.9 NONINTRACTABLE HEADACHE, UNSPECIFIED CHRONICITY PATTERN, UNSPECIFIED HEADACHE TYPE: Primary | ICD-10-CM

## 2024-11-22 PROBLEM — Z72.0 TOBACCO ABUSE: Status: ACTIVE | Noted: 2024-11-22

## 2024-11-22 PROBLEM — G89.4 CHRONIC PAIN SYNDROME: Status: ACTIVE | Noted: 2024-11-22

## 2024-11-22 PROBLEM — I10 PRIMARY HYPERTENSION: Status: ACTIVE | Noted: 2024-11-22

## 2024-11-22 PROBLEM — E87.6 HYPOKALEMIA: Status: ACTIVE | Noted: 2024-11-22

## 2024-11-22 PROBLEM — K21.9 GERD (GASTROESOPHAGEAL REFLUX DISEASE): Status: ACTIVE | Noted: 2024-11-22

## 2024-11-22 PROBLEM — D64.9 NORMOCYTIC ANEMIA: Status: ACTIVE | Noted: 2024-11-22

## 2024-11-22 LAB
ALBUMIN SERPL BCP-MCNC: 3.7 G/DL (ref 3.5–5.2)
ALP SERPL-CCNC: 60 U/L (ref 40–150)
ALT SERPL W/O P-5'-P-CCNC: 8 U/L (ref 10–44)
AMPHET+METHAMPHET UR QL: NEGATIVE
ANION GAP SERPL CALC-SCNC: 10 MMOL/L (ref 8–16)
AST SERPL-CCNC: 15 U/L (ref 10–40)
B-HCG UR QL: NEGATIVE
BARBITURATES UR QL SCN>200 NG/ML: NEGATIVE
BASOPHILS # BLD AUTO: 0.04 K/UL (ref 0–0.2)
BASOPHILS NFR BLD: 0.5 % (ref 0–1.9)
BENZODIAZ UR QL SCN>200 NG/ML: NEGATIVE
BILIRUB SERPL-MCNC: 0.2 MG/DL (ref 0.1–1)
BILIRUB UR QL STRIP: NEGATIVE
BNP SERPL-MCNC: 10 PG/ML (ref 0–99)
BUN SERPL-MCNC: 14 MG/DL (ref 6–20)
BZE UR QL SCN: NEGATIVE
CALCIUM SERPL-MCNC: 9 MG/DL (ref 8.7–10.5)
CANNABINOIDS UR QL SCN: ABNORMAL
CHLORIDE SERPL-SCNC: 102 MMOL/L (ref 95–110)
CK SERPL-CCNC: 124 U/L (ref 20–180)
CLARITY UR: CLEAR
CO2 SERPL-SCNC: 25 MMOL/L (ref 23–29)
COLOR UR: COLORLESS
CREAT SERPL-MCNC: 0.9 MG/DL (ref 0.5–1.4)
CREAT UR-MCNC: 22.4 MG/DL (ref 15–325)
DIFFERENTIAL METHOD BLD: ABNORMAL
EOSINOPHIL # BLD AUTO: 0.1 K/UL (ref 0–0.5)
EOSINOPHIL NFR BLD: 1.5 % (ref 0–8)
ERYTHROCYTE [DISTWIDTH] IN BLOOD BY AUTOMATED COUNT: 16.9 % (ref 11.5–14.5)
ERYTHROCYTE [SEDIMENTATION RATE] IN BLOOD BY WESTERGREN METHOD: 30 MM/HR (ref 0–36)
EST. GFR  (NO RACE VARIABLE): >60 ML/MIN/1.73 M^2
FERRITIN SERPL-MCNC: 6 NG/ML (ref 20–300)
GLUCOSE SERPL-MCNC: 104 MG/DL (ref 70–110)
GLUCOSE UR QL STRIP: NEGATIVE
HCT VFR BLD AUTO: 29.4 % (ref 37–48.5)
HCV AB SERPL QL IA: NEGATIVE
HEP C VIRUS HOLD SPECIMEN: NORMAL
HGB BLD-MCNC: 9.5 G/DL (ref 12–16)
HGB UR QL STRIP: NEGATIVE
HIV 1+2 AB+HIV1 P24 AG SERPL QL IA: NEGATIVE
IMM GRANULOCYTES # BLD AUTO: 0.02 K/UL (ref 0–0.04)
IMM GRANULOCYTES NFR BLD AUTO: 0.3 % (ref 0–0.5)
INR PPP: 1 (ref 0.8–1.2)
KETONES UR QL STRIP: NEGATIVE
LEUKOCYTE ESTERASE UR QL STRIP: NEGATIVE
LYMPHOCYTES # BLD AUTO: 2.7 K/UL (ref 1–4.8)
LYMPHOCYTES NFR BLD: 35.6 % (ref 18–48)
MAGNESIUM SERPL-MCNC: 2.1 MG/DL (ref 1.6–2.6)
MCH RBC QN AUTO: 26.5 PG (ref 27–31)
MCHC RBC AUTO-ENTMCNC: 32.3 G/DL (ref 32–36)
MCV RBC AUTO: 82 FL (ref 82–98)
METHADONE UR QL SCN>300 NG/ML: NEGATIVE
MONOCYTES # BLD AUTO: 0.8 K/UL (ref 0.3–1)
MONOCYTES NFR BLD: 10.4 % (ref 4–15)
NEUTROPHILS # BLD AUTO: 3.9 K/UL (ref 1.8–7.7)
NEUTROPHILS NFR BLD: 51.7 % (ref 38–73)
NITRITE UR QL STRIP: NEGATIVE
NRBC BLD-RTO: 0 /100 WBC
OPIATES UR QL SCN: NEGATIVE
PCP UR QL SCN>25 NG/ML: NEGATIVE
PH UR STRIP: 7 [PH] (ref 5–8)
PLATELET # BLD AUTO: 317 K/UL (ref 150–450)
PMV BLD AUTO: 9.4 FL (ref 9.2–12.9)
POTASSIUM SERPL-SCNC: 3.4 MMOL/L (ref 3.5–5.1)
PROT SERPL-MCNC: 7 G/DL (ref 6–8.4)
PROT UR QL STRIP: NEGATIVE
PROTHROMBIN TIME: 11.2 SEC (ref 9–12.5)
RBC # BLD AUTO: 3.58 M/UL (ref 4–5.4)
SODIUM SERPL-SCNC: 137 MMOL/L (ref 136–145)
SP GR UR STRIP: <1.005 (ref 1–1.03)
TOXICOLOGY INFORMATION: ABNORMAL
TROPONIN I SERPL DL<=0.01 NG/ML-MCNC: 0.01 NG/ML (ref 0–0.03)
TROPONIN I SERPL DL<=0.01 NG/ML-MCNC: 0.02 NG/ML (ref 0–0.03)
URN SPEC COLLECT METH UR: ABNORMAL
UROBILINOGEN UR STRIP-ACNC: NEGATIVE EU/DL
WBC # BLD AUTO: 7.52 K/UL (ref 3.9–12.7)

## 2024-11-22 PROCEDURE — 83880 ASSAY OF NATRIURETIC PEPTIDE: CPT | Performed by: FAMILY MEDICINE

## 2024-11-22 PROCEDURE — 84484 ASSAY OF TROPONIN QUANT: CPT | Performed by: FAMILY MEDICINE

## 2024-11-22 PROCEDURE — G0378 HOSPITAL OBSERVATION PER HR: HCPCS

## 2024-11-22 PROCEDURE — 81003 URINALYSIS AUTO W/O SCOPE: CPT | Mod: 59 | Performed by: FAMILY MEDICINE

## 2024-11-22 PROCEDURE — 80307 DRUG TEST PRSMV CHEM ANLYZR: CPT | Performed by: FAMILY MEDICINE

## 2024-11-22 PROCEDURE — 80053 COMPREHEN METABOLIC PANEL: CPT | Performed by: FAMILY MEDICINE

## 2024-11-22 PROCEDURE — 84484 ASSAY OF TROPONIN QUANT: CPT | Mod: 91 | Performed by: INTERNAL MEDICINE

## 2024-11-22 PROCEDURE — 82746 ASSAY OF FOLIC ACID SERUM: CPT | Performed by: INTERNAL MEDICINE

## 2024-11-22 PROCEDURE — 93010 ELECTROCARDIOGRAM REPORT: CPT | Mod: ,,, | Performed by: STUDENT IN AN ORGANIZED HEALTH CARE EDUCATION/TRAINING PROGRAM

## 2024-11-22 PROCEDURE — 93005 ELECTROCARDIOGRAM TRACING: CPT

## 2024-11-22 PROCEDURE — 25000003 PHARM REV CODE 250: Performed by: EMERGENCY MEDICINE

## 2024-11-22 PROCEDURE — 82728 ASSAY OF FERRITIN: CPT | Performed by: INTERNAL MEDICINE

## 2024-11-22 PROCEDURE — 25000003 PHARM REV CODE 250: Performed by: FAMILY MEDICINE

## 2024-11-22 PROCEDURE — 87389 HIV-1 AG W/HIV-1&-2 AB AG IA: CPT | Performed by: EMERGENCY MEDICINE

## 2024-11-22 PROCEDURE — 85025 COMPLETE CBC W/AUTO DIFF WBC: CPT | Performed by: FAMILY MEDICINE

## 2024-11-22 PROCEDURE — 82607 VITAMIN B-12: CPT | Performed by: INTERNAL MEDICINE

## 2024-11-22 PROCEDURE — 86803 HEPATITIS C AB TEST: CPT | Performed by: EMERGENCY MEDICINE

## 2024-11-22 PROCEDURE — 99285 EMERGENCY DEPT VISIT HI MDM: CPT | Mod: 25

## 2024-11-22 PROCEDURE — 83735 ASSAY OF MAGNESIUM: CPT | Performed by: INTERNAL MEDICINE

## 2024-11-22 PROCEDURE — 85651 RBC SED RATE NONAUTOMATED: CPT | Performed by: INTERNAL MEDICINE

## 2024-11-22 PROCEDURE — 25000003 PHARM REV CODE 250: Performed by: INTERNAL MEDICINE

## 2024-11-22 PROCEDURE — 81025 URINE PREGNANCY TEST: CPT | Performed by: FAMILY MEDICINE

## 2024-11-22 PROCEDURE — 82550 ASSAY OF CK (CPK): CPT | Performed by: INTERNAL MEDICINE

## 2024-11-22 PROCEDURE — 85610 PROTHROMBIN TIME: CPT | Performed by: INTERNAL MEDICINE

## 2024-11-22 PROCEDURE — 84466 ASSAY OF TRANSFERRIN: CPT | Performed by: INTERNAL MEDICINE

## 2024-11-22 RX ORDER — BUTALBITAL, ACETAMINOPHEN AND CAFFEINE 50; 325; 40 MG/1; MG/1; MG/1
1 TABLET ORAL
Status: COMPLETED | OUTPATIENT
Start: 2024-11-22 | End: 2024-11-22

## 2024-11-22 RX ORDER — HYDROCODONE BITARTRATE AND ACETAMINOPHEN 5; 325 MG/1; MG/1
1 TABLET ORAL EVERY 6 HOURS PRN
Status: DISCONTINUED | OUTPATIENT
Start: 2024-11-22 | End: 2024-11-23 | Stop reason: HOSPADM

## 2024-11-22 RX ORDER — ACETAMINOPHEN 325 MG/1
650 TABLET ORAL EVERY 6 HOURS PRN
Status: DISCONTINUED | OUTPATIENT
Start: 2024-11-22 | End: 2024-11-23 | Stop reason: HOSPADM

## 2024-11-22 RX ORDER — NALOXONE HCL 0.4 MG/ML
0.02 VIAL (ML) INJECTION
Status: DISCONTINUED | OUTPATIENT
Start: 2024-11-22 | End: 2024-11-23 | Stop reason: HOSPADM

## 2024-11-22 RX ORDER — SODIUM CHLORIDE 0.9 % (FLUSH) 0.9 %
10 SYRINGE (ML) INJECTION EVERY 12 HOURS PRN
Status: DISCONTINUED | OUTPATIENT
Start: 2024-11-22 | End: 2024-11-23 | Stop reason: HOSPADM

## 2024-11-22 RX ORDER — HYDRALAZINE HYDROCHLORIDE 20 MG/ML
10 INJECTION INTRAMUSCULAR; INTRAVENOUS EVERY 6 HOURS PRN
Status: DISCONTINUED | OUTPATIENT
Start: 2024-11-22 | End: 2024-11-23 | Stop reason: HOSPADM

## 2024-11-22 RX ORDER — MORPHINE SULFATE 4 MG/ML
2 INJECTION, SOLUTION INTRAMUSCULAR; INTRAVENOUS EVERY 6 HOURS PRN
Status: DISCONTINUED | OUTPATIENT
Start: 2024-11-22 | End: 2024-11-23 | Stop reason: HOSPADM

## 2024-11-22 RX ORDER — IBUPROFEN 200 MG
16 TABLET ORAL
Status: DISCONTINUED | OUTPATIENT
Start: 2024-11-22 | End: 2024-11-23 | Stop reason: HOSPADM

## 2024-11-22 RX ORDER — IBUPROFEN 200 MG
24 TABLET ORAL
Status: DISCONTINUED | OUTPATIENT
Start: 2024-11-22 | End: 2024-11-23 | Stop reason: HOSPADM

## 2024-11-22 RX ORDER — PANTOPRAZOLE SODIUM 40 MG/10ML
40 INJECTION, POWDER, LYOPHILIZED, FOR SOLUTION INTRAVENOUS DAILY
Status: DISCONTINUED | OUTPATIENT
Start: 2024-11-23 | End: 2024-11-23 | Stop reason: HOSPADM

## 2024-11-22 RX ORDER — ASPIRIN 325 MG
325 TABLET ORAL
Status: COMPLETED | OUTPATIENT
Start: 2024-11-22 | End: 2024-11-22

## 2024-11-22 RX ORDER — GLUCAGON 1 MG
1 KIT INJECTION
Status: DISCONTINUED | OUTPATIENT
Start: 2024-11-22 | End: 2024-11-23 | Stop reason: HOSPADM

## 2024-11-22 RX ORDER — ONDANSETRON HYDROCHLORIDE 2 MG/ML
4 INJECTION, SOLUTION INTRAVENOUS EVERY 6 HOURS PRN
Status: DISCONTINUED | OUTPATIENT
Start: 2024-11-22 | End: 2024-11-23 | Stop reason: HOSPADM

## 2024-11-22 RX ADMIN — POTASSIUM BICARBONATE 40 MEQ: 391 TABLET, EFFERVESCENT ORAL at 10:11

## 2024-11-22 RX ADMIN — ASPIRIN 325 MG ORAL TABLET 325 MG: 325 PILL ORAL at 08:11

## 2024-11-22 RX ADMIN — BUTALBITAL, ACETAMINOPHEN, AND CAFFEINE 1 TABLET: 325; 50; 40 TABLET ORAL at 07:11

## 2024-11-22 NOTE — LETTER
"November 23, 2024             [facility] [address] [place] [address]  Phone: [phone]  November 23, 2024     Patient: Danyell Whelan (Qiana)     YOB: 1980        To Whom It May Concern:    Danyell Whelan was admitted to the hospital on 11/22/2024  6:50 PM and was still hospitalized as of the morning of 11/23/24. She was accompanied by her spouse.  If you have any questions or concerns, or if I can be of any further assistance, please do not hesitate to contact me.    Sincerely,        ________________________  Dr. You Hernandez  [facility] [address] [place] [address]  Phone: [phone]      "

## 2024-11-23 VITALS
HEART RATE: 61 BPM | SYSTOLIC BLOOD PRESSURE: 129 MMHG | DIASTOLIC BLOOD PRESSURE: 88 MMHG | BODY MASS INDEX: 28.85 KG/M2 | RESPIRATION RATE: 18 BRPM | WEIGHT: 169 LBS | TEMPERATURE: 98 F | HEIGHT: 64 IN | OXYGEN SATURATION: 100 %

## 2024-11-23 LAB
ALBUMIN SERPL BCP-MCNC: 3.5 G/DL (ref 3.5–5.2)
ALP SERPL-CCNC: 56 U/L (ref 40–150)
ALT SERPL W/O P-5'-P-CCNC: 8 U/L (ref 10–44)
ANION GAP SERPL CALC-SCNC: 8 MMOL/L (ref 8–16)
AORTIC ROOT ANNULUS: 3.09 CM
ASCENDING AORTA: 3.16 CM
AST SERPL-CCNC: 12 U/L (ref 10–40)
AV INDEX (PROSTH): 0.72
AV MEAN GRADIENT: 4.6 MMHG
AV PEAK GRADIENT: 7.8 MMHG
AV VALVE AREA BY VELOCITY RATIO: 2.5 CM²
AV VALVE AREA: 2.2 CM²
AV VELOCITY RATIO: 0.79
BASOPHILS # BLD AUTO: 0.03 K/UL (ref 0–0.2)
BASOPHILS NFR BLD: 0.5 % (ref 0–1.9)
BILIRUB SERPL-MCNC: 0.3 MG/DL (ref 0.1–1)
BSA FOR ECHO PROCEDURE: 1.86 M2
BUN SERPL-MCNC: 12 MG/DL (ref 6–20)
CALCIUM SERPL-MCNC: 8.5 MG/DL (ref 8.7–10.5)
CHLORIDE SERPL-SCNC: 104 MMOL/L (ref 95–110)
CHOLEST SERPL-MCNC: 136 MG/DL (ref 120–199)
CHOLEST/HDLC SERPL: 2.8 {RATIO} (ref 2–5)
CO2 SERPL-SCNC: 27 MMOL/L (ref 23–29)
CREAT SERPL-MCNC: 0.8 MG/DL (ref 0.5–1.4)
CV ECHO LV RWT: 0.34 CM
CV STRESS BASE HR: 56 BPM
DIASTOLIC BLOOD PRESSURE: 88 MMHG
DIFFERENTIAL METHOD BLD: ABNORMAL
DOP CALC AO PEAK VEL: 1.4 M/S
DOP CALC AO VTI: 33.1 CM
DOP CALC LVOT AREA: 3.1 CM2
DOP CALC LVOT DIAMETER: 2 CM
DOP CALC LVOT PEAK VEL: 1.1 M/S
DOP CALC LVOT STROKE VOLUME: 74.4 CM3
DOP CALC RVOT PEAK VEL: 0.79 M/S
DOP CALC RVOT VTI: 16.6 CM
DOP CALCLVOT PEAK VEL VTI: 23.7 CM
E WAVE DECELERATION TIME: 217.29 MSEC
E/A RATIO: 1.28
E/E' RATIO: 6.4 M/S
ECHO LV POSTERIOR WALL: 0.8 CM (ref 0.6–1.1)
EJECTION FRACTION- HIGH: 73 %
EJECTION FRACTION: 65 %
END DIASTOLIC INDEX-HIGH: 165 ML/M2
END DIASTOLIC INDEX-LOW: 101 ML/M2
END SYSTOLIC INDEX-HIGH: 64 ML/M2
END SYSTOLIC INDEX-LOW: 28 ML/M2
EOSINOPHIL # BLD AUTO: 0.1 K/UL (ref 0–0.5)
EOSINOPHIL NFR BLD: 1.4 % (ref 0–8)
ERYTHROCYTE [DISTWIDTH] IN BLOOD BY AUTOMATED COUNT: 16.9 % (ref 11.5–14.5)
EST. GFR  (NO RACE VARIABLE): >60 ML/MIN/1.73 M^2
FOLATE SERPL-MCNC: 5.2 NG/ML (ref 4–24)
FRACTIONAL SHORTENING: 34 % (ref 28–44)
GLUCOSE SERPL-MCNC: 78 MG/DL (ref 70–110)
HCT VFR BLD AUTO: 29.4 % (ref 37–48.5)
HDLC SERPL-MCNC: 49 MG/DL (ref 40–75)
HDLC SERPL: 36 % (ref 20–50)
HGB BLD-MCNC: 9.3 G/DL (ref 12–16)
IMM GRANULOCYTES # BLD AUTO: 0.02 K/UL (ref 0–0.04)
IMM GRANULOCYTES NFR BLD AUTO: 0.4 % (ref 0–0.5)
INTERVENTRICULAR SEPTUM: 0.9 CM (ref 0.6–1.1)
IRON SERPL-MCNC: 14 UG/DL (ref 30–160)
IVC DIAMETER: 1.72 CM
IVRT: 64.7 MSEC
LA MAJOR: 4.65 CM
LA MINOR: 4.92 CM
LA WIDTH: 3.2 CM
LDLC SERPL CALC-MCNC: 76.2 MG/DL (ref 63–159)
LEFT ATRIUM SIZE: 3.12 CM
LEFT ATRIUM VOLUME INDEX: 22.3 ML/M2
LEFT ATRIUM VOLUME: 40.58 CM3
LEFT INTERNAL DIMENSION IN SYSTOLE: 3.1 CM (ref 2.1–4)
LEFT VENTRICLE DIASTOLIC VOLUME INDEX: 57.25 ML/M2
LEFT VENTRICLE DIASTOLIC VOLUME: 104.19 ML
LEFT VENTRICLE MASS INDEX: 72.7 G/M2
LEFT VENTRICLE SYSTOLIC VOLUME INDEX: 20.3 ML/M2
LEFT VENTRICLE SYSTOLIC VOLUME: 36.87 ML
LEFT VENTRICULAR INTERNAL DIMENSION IN DIASTOLE: 4.7 CM (ref 3.5–6)
LEFT VENTRICULAR MASS: 132.3 G
LV LATERAL E/E' RATIO: 6.4 M/S
LV SEPTAL E/E' RATIO: 6.4 M/S
LVED V (TEICH): 104.19 ML
LVES V (TEICH): 36.87 ML
LVOT MG: 2.97 MMHG
LVOT MV: 0.84 CM/S
LYMPHOCYTES # BLD AUTO: 1.7 K/UL (ref 1–4.8)
LYMPHOCYTES NFR BLD: 29.6 % (ref 18–48)
MCH RBC QN AUTO: 26.3 PG (ref 27–31)
MCHC RBC AUTO-ENTMCNC: 31.6 G/DL (ref 32–36)
MCV RBC AUTO: 83 FL (ref 82–98)
MONOCYTES # BLD AUTO: 0.6 K/UL (ref 0.3–1)
MONOCYTES NFR BLD: 11.2 % (ref 4–15)
MV PEAK A VEL: 0.75 M/S
MV PEAK E VEL: 0.96 M/S
MV STENOSIS PRESSURE HALF TIME: 63.01 MS
MV VALVE AREA P 1/2 METHOD: 3.49 CM2
NEUTROPHILS # BLD AUTO: 3.3 K/UL (ref 1.8–7.7)
NEUTROPHILS NFR BLD: 56.9 % (ref 38–73)
NONHDLC SERPL-MCNC: 87 MG/DL
NRBC BLD-RTO: 0 /100 WBC
NUC REST DIASTOLIC VOLUME INDEX: 97
NUC REST EJECTION FRACTION: 74
NUC REST SYSTOLIC VOLUME INDEX: 25
OHS CV CPX 85 PERCENT MAX PREDICTED HEART RATE MALE: 149
OHS CV CPX MAX PREDICTED HEART RATE: 175
OHS CV CPX PATIENT IS FEMALE: 1
OHS CV CPX PATIENT IS MALE: 0
OHS CV CPX PEAK DIASTOLIC BLOOD PRESSURE: 74 MMHG
OHS CV CPX PEAK HEAR RATE: 96 BPM
OHS CV CPX PEAK RATE PRESSURE PRODUCT: NORMAL
OHS CV CPX PEAK SYSTOLIC BLOOD PRESSURE: 141 MMHG
OHS CV CPX PERCENT MAX PREDICTED HEART RATE ACHIEVED: 58
OHS CV CPX RATE PRESSURE PRODUCT PRESENTING: 7224
OHS CV INITIAL DOSE: 10.6 MCG/KG/MIN
OHS CV PEAK DOSE: 33.2 MCG/KG/MIN
PISA TR MAX VEL: 2.07 M/S
PLATELET # BLD AUTO: 310 K/UL (ref 150–450)
PMV BLD AUTO: 10.1 FL (ref 9.2–12.9)
POTASSIUM SERPL-SCNC: 4.1 MMOL/L (ref 3.5–5.1)
PROT SERPL-MCNC: 6.6 G/DL (ref 6–8.4)
PV MEAN GRADIENT: 2 MMHG
RA MAJOR: 3.86 CM
RA PRESSURE ESTIMATED: 3 MMHG
RA WIDTH: 2.5 CM
RBC # BLD AUTO: 3.54 M/UL (ref 4–5.4)
RETIRED EF AND QEF - SEE NOTES: 59 %
RV TB RVSP: 5 MMHG
SATURATED IRON: 3 % (ref 20–50)
SODIUM SERPL-SCNC: 139 MMOL/L (ref 136–145)
STJ: 3.23 CM
SYSTOLIC BLOOD PRESSURE: 129 MMHG
TDI LATERAL: 0.15 M/S
TDI SEPTAL: 0.15 M/S
TDI: 0.15 M/S
TOTAL IRON BINDING CAPACITY: 431 UG/DL (ref 250–450)
TR MAX PG: 17 MMHG
TRANSFERRIN SERPL-MCNC: 291 MG/DL (ref 200–375)
TRICUSPID ANNULAR PLANE SYSTOLIC EXCURSION: 2.4 CM
TRIGL SERPL-MCNC: 54 MG/DL (ref 30–150)
TROPONIN I SERPL DL<=0.01 NG/ML-MCNC: <0.006 NG/ML (ref 0–0.03)
TV REST PULMONARY ARTERY PRESSURE: 20 MMHG
VIT B12 SERPL-MCNC: 202 PG/ML (ref 210–950)
WBC # BLD AUTO: 5.71 K/UL (ref 3.9–12.7)
Z-SCORE OF LEFT VENTRICULAR DIMENSION IN END DIASTOLE: -0.7
Z-SCORE OF LEFT VENTRICULAR DIMENSION IN END SYSTOLE: -0.03

## 2024-11-23 PROCEDURE — 99900035 HC TECH TIME PER 15 MIN (STAT)

## 2024-11-23 PROCEDURE — 93005 ELECTROCARDIOGRAM TRACING: CPT

## 2024-11-23 PROCEDURE — 94799 UNLISTED PULMONARY SVC/PX: CPT | Mod: XB

## 2024-11-23 PROCEDURE — 36415 COLL VENOUS BLD VENIPUNCTURE: CPT | Performed by: INTERNAL MEDICINE

## 2024-11-23 PROCEDURE — 99204 OFFICE O/P NEW MOD 45 MIN: CPT | Mod: 25,,, | Performed by: STUDENT IN AN ORGANIZED HEALTH CARE EDUCATION/TRAINING PROGRAM

## 2024-11-23 PROCEDURE — 96372 THER/PROPH/DIAG INJ SC/IM: CPT | Performed by: STUDENT IN AN ORGANIZED HEALTH CARE EDUCATION/TRAINING PROGRAM

## 2024-11-23 PROCEDURE — 25000003 PHARM REV CODE 250: Performed by: INTERNAL MEDICINE

## 2024-11-23 PROCEDURE — A9502 TC99M TETROFOSMIN: HCPCS | Performed by: STUDENT IN AN ORGANIZED HEALTH CARE EDUCATION/TRAINING PROGRAM

## 2024-11-23 PROCEDURE — 63600175 PHARM REV CODE 636 W HCPCS: Performed by: STUDENT IN AN ORGANIZED HEALTH CARE EDUCATION/TRAINING PROGRAM

## 2024-11-23 PROCEDURE — 93010 ELECTROCARDIOGRAM REPORT: CPT | Mod: 59,,, | Performed by: STUDENT IN AN ORGANIZED HEALTH CARE EDUCATION/TRAINING PROGRAM

## 2024-11-23 PROCEDURE — 85025 COMPLETE CBC W/AUTO DIFF WBC: CPT | Performed by: INTERNAL MEDICINE

## 2024-11-23 PROCEDURE — G0378 HOSPITAL OBSERVATION PER HR: HCPCS

## 2024-11-23 PROCEDURE — 25000003 PHARM REV CODE 250: Performed by: STUDENT IN AN ORGANIZED HEALTH CARE EDUCATION/TRAINING PROGRAM

## 2024-11-23 PROCEDURE — 84484 ASSAY OF TROPONIN QUANT: CPT | Performed by: INTERNAL MEDICINE

## 2024-11-23 PROCEDURE — 94761 N-INVAS EAR/PLS OXIMETRY MLT: CPT

## 2024-11-23 PROCEDURE — 63600175 PHARM REV CODE 636 W HCPCS: Performed by: INTERNAL MEDICINE

## 2024-11-23 PROCEDURE — 80061 LIPID PANEL: CPT | Performed by: INTERNAL MEDICINE

## 2024-11-23 PROCEDURE — 96374 THER/PROPH/DIAG INJ IV PUSH: CPT

## 2024-11-23 PROCEDURE — 96375 TX/PRO/DX INJ NEW DRUG ADDON: CPT

## 2024-11-23 PROCEDURE — 80053 COMPREHEN METABOLIC PANEL: CPT | Performed by: INTERNAL MEDICINE

## 2024-11-23 RX ORDER — PANTOPRAZOLE SODIUM 40 MG/1
40 TABLET, DELAYED RELEASE ORAL DAILY
Qty: 30 TABLET | Refills: 0 | Status: SHIPPED | OUTPATIENT
Start: 2024-11-23

## 2024-11-23 RX ORDER — CYANOCOBALAMIN 1000 UG/ML
1000 INJECTION, SOLUTION INTRAMUSCULAR; SUBCUTANEOUS DAILY
Status: DISCONTINUED | OUTPATIENT
Start: 2024-11-23 | End: 2024-11-23 | Stop reason: HOSPADM

## 2024-11-23 RX ORDER — REGADENOSON 0.08 MG/ML
0.4 INJECTION, SOLUTION INTRAVENOUS ONCE
Status: COMPLETED | OUTPATIENT
Start: 2024-11-23 | End: 2024-11-23

## 2024-11-23 RX ORDER — FERROUS SULFATE 325(65) MG
325 TABLET ORAL
Qty: 30 TABLET | Refills: 0 | Status: SHIPPED | OUTPATIENT
Start: 2024-11-23

## 2024-11-23 RX ORDER — LANOLIN ALCOHOL/MO/W.PET/CERES
1 CREAM (GRAM) TOPICAL DAILY
Status: DISCONTINUED | OUTPATIENT
Start: 2024-11-23 | End: 2024-11-23 | Stop reason: HOSPADM

## 2024-11-23 RX ADMIN — HYDROCODONE BITARTRATE AND ACETAMINOPHEN 1 TABLET: 5; 325 TABLET ORAL at 08:11

## 2024-11-23 RX ADMIN — FERROUS SULFATE TAB 325 MG (65 MG ELEMENTAL FE) 1 EACH: 325 (65 FE) TAB at 10:11

## 2024-11-23 RX ADMIN — IRON SUCROSE 200 MG: 20 INJECTION, SOLUTION INTRAVENOUS at 10:11

## 2024-11-23 RX ADMIN — ONDANSETRON 4 MG: 2 INJECTION INTRAMUSCULAR; INTRAVENOUS at 09:11

## 2024-11-23 RX ADMIN — CYANOCOBALAMIN 1000 MCG: 1000 INJECTION INTRAMUSCULAR; SUBCUTANEOUS at 10:11

## 2024-11-23 RX ADMIN — TETROFOSMIN 10.6 MILLICURIE: 1.38 INJECTION, POWDER, LYOPHILIZED, FOR SOLUTION INTRAVENOUS at 01:11

## 2024-11-23 RX ADMIN — PANTOPRAZOLE SODIUM 40 MG: 40 INJECTION, POWDER, FOR SOLUTION INTRAVENOUS at 08:11

## 2024-11-23 RX ADMIN — TETROFOSMIN 33.2 MILLICURIE: 1.38 INJECTION, POWDER, LYOPHILIZED, FOR SOLUTION INTRAVENOUS at 01:11

## 2024-11-23 RX ADMIN — REGADENOSON 0.4 MG: 0.08 INJECTION, SOLUTION INTRAVENOUS at 01:11

## 2024-11-23 RX ADMIN — Medication 1 TABLET: at 10:11
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
## 2024-11-23 NOTE — ASSESSMENT & PLAN NOTE
Anemia is likely due to  menorrhagia . Most recent hemoglobin and hematocrit are listed below.  Recent Labs     11/22/24  1906   HGB 9.5*   HCT 29.4*     Plan  - Monitor serial CBC: Daily  - Transfuse PRBC if patient becomes hemodynamically unstable, symptomatic or H/H drops below 7/21.  - Patient has not received any PRBC transfusions to date  - Patient's anemia is currently stable  - recent thyroid studies within normal range June 2024  -prior labs 02/04/2024 with folic acid level 8.9 and B12 level 190 (low)  -check iron studies, B12, folate, and INR

## 2024-11-23 NOTE — NURSING
Pt remains free of falls/injury. Safety precautions maintained. Pt denies pain/discomfort. IV removed per orders.  Regular diet tolerated. VSS. No S/S of distress noted at this time. Pt education completed.   brought prescription to pt at bedside.
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
## 2024-11-23 NOTE — PLAN OF CARE
[facility]  Discharge Final Note    Primary Care Provider: No, Primary Doctor    Expected Discharge Date: 11/23/2024    Final Discharge Note (most recent)       Final Note - 11/23/24 1615          Final Note    Assessment Type Final Discharge Note     Anticipated Discharge Disposition Home or Self Care        Post-Acute Status    Discharge Delays None known at this time                     Important Message from Medicare             Contact Info       [facility]   Specialty: Internal Medicine    [address] [place] [address] [address] [place] [address]   Phone: [phone]       Next Steps: Schedule an appointment as soon as possible for a visit in 1 week(s)    Instructions: -Your labs remained stable, but some labs still remained abnormal. As we discussed, it is extremely important for you to followup with a primary care physician within 1 week for repeat lab work to ensure normalization, follow up of pending labs, medication adjustments, routine post-discharge care, and any other evaluations as necessitated.  Take a BP log with you as well.  If you do not have a PCP, we have placed a referral to [facility]. Please give our scheduling line a call at [phone] to schedule an appointment with them.    [facility]   Specialty: Cardiology    [address] [place] [address] [address] [place] [address]   Phone: [phone]       Next Steps: Schedule an appointment as soon as possible for a visit in 1 week(s)    Instructions: We have placed a referral to Cardiology for post discharge follow up.  Please give our scheduling line a call at [phone] to schedule an appointment with them if it has not been setup already.  They can also evaluate your blood pressure.    [facility] ([facility])   Specialty: Sleep Medicine    [id number] [place] [address]  [place] [address]   Phone: [phone]       Next Steps: Follow up    Instructions: Cardiology also recommended you  complete a sleep study. We have placed a referral to Sleep Medicine.  Please give our scheduling line a call at [phone] to schedule an appointment with them if it has not been setup already.

## 2024-11-23 NOTE — ASSESSMENT & PLAN NOTE
Patient's most recent potassium results are listed below.   Recent Labs     11/22/24  1906   K 3.4*     Plan  - Replete potassium per protocol  - Monitor potassium Daily  - Patient's hypokalemia is  pending reassessment  - give potassium bicarbonate 40 mEq p.o. x1 dose  -check magnesium level  -check a.m. labs  -telemetry monitoring

## 2024-11-23 NOTE — DISCHARGE INSTRUCTIONS
-You were admitted to our hospital for evaluation of chest pain. Over the course of your hospitalization, our Cardiology team also evaluated you.    -Please read the attached information regarding chest pain and go to your nearest ED if any of the alarm/concerning signs develop.    -We have placed a referral to Cardiology.  Please give our scheduling line a call at [phone] to schedule an appointment with them if it has not been setup already.    -Cardiology also recommended you complete a sleep study. We have placed a referral to Sleep Medicine.  Please give our scheduling line a call at [phone] to schedule an appointment with them if it has not been setup already.    -Your blood pressures were elevated during your hospitalization. Please make sure to frequently monitor your home BP, if it remains elevated please reach out to your healthcare provider.  Maintain a BP log and take it to your next healthcare provider visit within 1 week.    -Your lab results noted concerns for a deficiency in iron and B12 level.  Please make sure to follow up with your PCP for further evaluation.  We have prescribed you prescriptions for supplementation.     -Your labs remained stable, but some labs still remained abnormal. As we discussed, it is extremely important for you to followup with a primary care physician within 1 week for repeat lab work to ensure normalization, follow up of pending labs, medication adjustments, routine post-discharge care, and any other evaluations as necessitated.  Take a BP log with you as well.  If you do not have a PCP, we have placed a referral to [facility]. Please give our scheduling line a call at [phone] to schedule an appointment with them.

## 2024-11-23 NOTE — ASSESSMENT & PLAN NOTE
-p.r.n. pain control  -labs 03/13/2024 with rheumatoid factor 10.5, JAYSHREE negative, anti Ro antibody negative  -labs 2/4/24 with folic acid level 8.9 and B12 190  -labs 06/07/2024 with TSH 0.644 and free T4 0.88  -check B12, folate, ESR, and CK level

## 2024-11-23 NOTE — ASSESSMENT & PLAN NOTE
Patient reports chronic headaches.  -CT scan of head negative for an acute process  -HIV negative, white blood cell count 7.52  -check ESR  -p.r.n. pain control

## 2024-11-23 NOTE — ASSESSMENT & PLAN NOTE
Patients blood pressure range in the last 24 hours was: BP  Min: 116/74  Max: 140/72.The patient's inpatient anti-hypertensive regimen is listed below:  Current Antihypertensives  hydrALAZINE injection 10 mg, Every 6 hours PRN, Intravenous    Plan  - BP is controlled, no changes needed to their regimen  - currently on no home medications?  -cardiac diet, NPO at midnight

## 2024-11-23 NOTE — SUBJECTIVE & OBJECTIVE
Past Medical History:   Diagnosis Date    Hypertension        Past Surgical History:   Procedure Laterality Date     SECTION      x 3    TONSILLECTOMY         Review of patient's allergies indicates:  No Known Allergies    No current facility-administered medications on file prior to encounter.     Current Outpatient Medications on File Prior to Encounter   Medication Sig    amoxicillin (AMOXIL) 875 MG tablet Take 1 tablet (875 mg total) by mouth 2 (two) times daily.    ondansetron (ZOFRAN) 4 MG tablet Take 1 tablet (4 mg total) by mouth every 12 (twelve) hours as needed for Nausea.    ondansetron (ZOFRAN-ODT) 4 MG TbDL Take 1 tablet (4 mg total) by mouth every 6 (six) hours as needed.    [DISCONTINUED] lisinopriL 10 MG tablet Take 1 tablet (10 mg total) by mouth once daily.     Family History    None       Tobacco Use    Smoking status: Every Day     Current packs/day: 0.25     Types: Cigarettes    Smokeless tobacco: Never   Substance and Sexual Activity    Alcohol use: Yes     Comment: rarely    Drug use: No    Sexual activity: Not on file     Review of Systems   Constitutional:  Negative for chills and fever.   Respiratory:  Positive for shortness of breath. Negative for cough.    Cardiovascular:  Positive for chest pain.   Gastrointestinal:  Negative for nausea and vomiting.   Neurological:  Positive for headaches.   All other systems reviewed and are negative.    Objective:     Vital Signs (Most Recent):  Temp: 98.1 °F (36.7 °C) (24)  Pulse: 66 (24)  Resp: 19 (24)  BP: 116/74 (24)  SpO2: 100 % (24) Vital Signs (24h Range):  Temp:  [98.1 °F (36.7 °C)] 98.1 °F (36.7 °C)  Pulse:  [66-93] 66  Resp:  [16-19] 19  SpO2:  [100 %] 100 %  BP: (116-140)/(68-74) 116/74        There is no height or weight on file to calculate BMI.     Physical Exam  Vitals reviewed.   Constitutional:       General: She is not in acute distress.     Appearance: She is not  ill-appearing or diaphoretic.   HENT:      Nose: Nose normal.   Eyes:      Conjunctiva/sclera: Conjunctivae normal.   Cardiovascular:      Rate and Rhythm: Normal rate.   Pulmonary:      Effort: Pulmonary effort is normal. No respiratory distress.   Abdominal:      General: There is no distension.      Tenderness: There is no abdominal tenderness.   Musculoskeletal:         General: No swelling.   Skin:     General: Skin is warm and dry.   Neurological:      Mental Status: She is alert and oriented to person, place, and time.   Psychiatric:         Mood and Affect: Mood normal.         Behavior: Behavior normal.                Significant Labs: All pertinent labs within the past 24 hours have been reviewed.  Recent Lab Results         11/22/24  1920   11/22/24  1906        Albumin   3.7       ALP   60       ALT   8       Anion Gap   10       Appearance, UA Clear         AST   15       Baso #   0.04       Basophil %   0.5       Bilirubin (UA) Negative         BILIRUBIN TOTAL   0.2  Comment: For infants and newborns, interpretation of results should be based  on gestational age, weight and in agreement with clinical  observations.    Premature Infant recommended reference ranges:  Up to 24 hours.............<8.0 mg/dL  Up to 48 hours............<12.0 mg/dL  3-5 days..................<15.0 mg/dL  6-29 days.................<15.0 mg/dL         BNP   10  Comment: Values of less than 100 pg/ml are consistent with non-CHF populations.       BUN   14       Calcium   9.0       Chloride   102       CO2   25       Color, UA Colorless         Creatinine   0.9       Differential Method   Automated       eGFR   >60       Eos #   0.1       Eos %   1.5       Glucose   104       Glucose, UA Negative         Gran # (ANC)   3.9       Gran %   51.7       Hematocrit   29.4       Hemoglobin   9.5       Hepatitis C Ab   Negative       HEP C Virus Hold Specimen   Hold for HCV sendout       HIV 1/2 Ag/Ab   Negative       Immature Grans (Abs)    0.02  Comment: Mild elevation in immature granulocytes is non specific and   can be seen in a variety of conditions including stress response,   acute inflammation, trauma and pregnancy. Correlation with other   laboratory and clinical findings is essential.         Immature Granulocytes   0.3       Ketones, UA Negative         Leukocyte Esterase, UA Negative         Lymph #   2.7       Lymph %   35.6       MCH   26.5       MCHC   32.3       MCV   82       Mono #   0.8       Mono %   10.4       MPV   9.4       NITRITE UA Negative         nRBC   0       Blood, UA Negative         pH, UA 7.0         Platelet Count   317       Potassium   3.4       PROTEIN TOTAL   7.0       Protein, UA Negative  Comment: Recommend a 24 hour urine protein or a urine   protein/creatinine ratio if globulin induced proteinuria is  clinically suspected.           RBC   3.58       RDW   16.9       Sodium   137       Spec Grav UA <1.005         Specimen UA Urine, Clean Catch         Troponin I   0.021  Comment: The reference interval for Troponin I represents the 99th percentile   cutoff   for our facility and is consistent with 3rd generation assay   performance.         UROBILINOGEN UA Negative         WBC   7.52               Significant Imaging: I have reviewed all pertinent imaging results/findings within the past 24 hours.  CT Head Without Contrast   Final Result      No acute intracranial abnormality      All CT scans at this facility use dose modulation, iterative reconstructions, and/or weight base dosing when appropriate to reduce radiation dose to as low as reasonably achievable.         Electronically signed by: Jacinda Diez MD   Date:    11/22/2024   Time:    20:21      X-Ray Chest 1 View   Final Result      Negative single view chest x-ray.         Electronically signed by: Jacinda Diez MD   Date:    11/22/2024   Time:    20:04

## 2024-11-23 NOTE — ASSESSMENT & PLAN NOTE
-chest x-ray negative  -EKG with normal sinus rhythm at 88, no ST segment elevation, patient does have some nonspecific ST segment depression  -troponin 0.021, BNP 10  -white blood cell count 7.52, hemoglobin 9.5, LFTs unremarkable, vital signs stable and patient afebrile  -patient received aspirin 325 mg p.o. x1 dose in the emergency department  -recent labs 03/13/2024 with total cholesterol 145, triglycerides 61, HDL 62, LDL 70, A1c 5.5; labs 06/07/2024 with TSH 0.644 and free T4 0.88  -check serial cardiac enzymes, fasting lipid panel, UDS, UPT  -repeat EKG in a.m.  -check echocardiogram  -cardiac diet, NPO at midnight, telemetry monitoring, O2 supplementation, p.r.n. IV morphine  -cardiology consult for a.m.
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
## 2024-11-23 NOTE — ED PROVIDER NOTES
SCRIBE #1 NOTE: IRick, am scribing for, and in the presence of, Sarah Kirk MD. I have scribed the HPI, ROS, and PEx.     SCRIBE #2 NOTE: I, Keisha Abbasi, am scribing for, and in the presence of,  Sami Morrow Jr., MD. I have scribed the remaining portions of the note not scribed by Scribe #1.      History     Chief Complaint   Patient presents with    Chest Pain     Pt. C/o left sided chest pain for the past 10 min. Pt state the pain is mid sternal, and radiates to her left jaw and left shoulder. Pt denies any cardiac history      Review of patient's allergies indicates:  No Known Allergies      History of Present Illness     HPI    2024, 7:31 PM  History obtained from the  and patient       History of Present Illness: Danyell Whelan is a 44 y.o. female patient with a PMHx of HTN and chronic HA who presents to the Emergency Department for evaluation of left sided CP which onset gradually since around 5 PM today. Pt rates the CP as a 9/10. Pt notes her  gave her BC powder today when the CP started. Pt states that she also is feeling tingling in her left arm and the left side of her face. Pt states she is feeling left sided facial spasms periodically. Pt is also complaining of a HA which she had for the past two days. Pt, however, notes she suffers from chronic HA that are intermittent. Symptoms are intermittent and moderate in severity. No mitigating or exacerbating factors reported. Associated sxs include SOB. Patient denies any diaphoresis, palpitations, leg pain/swelling, and all other sxs at this time. No prior Tx reported. No further complaints or concerns at this time.     Arrival mode: Personal Transportation    PCP: Chanel, Primary Doctor        Past Medical History:  Past Medical History:   Diagnosis Date    Hypertension        Past Surgical History:  Past Surgical History:   Procedure Laterality Date     SECTION      x 3    TONSILLECTOMY           Family  History:  No family history on file.    Social History:  Social History     Tobacco Use    Smoking status: Every Day     Current packs/day: 0.25     Types: Cigarettes    Smokeless tobacco: Never   Substance and Sexual Activity    Alcohol use: Yes     Comment: rarely    Drug use: No    Sexual activity: Not on file        Review of Systems     Review of Systems   Constitutional:  Negative for chills and fever.   HENT:  Negative for sore throat.    Respiratory:  Positive for shortness of breath.    Cardiovascular:  Positive for chest pain (left-sided).   Gastrointestinal:  Negative for nausea and vomiting.   Genitourinary:  Negative for dysuria.   Musculoskeletal:  Negative for back pain.   Skin:  Negative for rash.   Neurological:  Positive for headaches. Negative for weakness.        (+) tingling to her left arm/left side of face   Hematological:  Does not bruise/bleed easily.   All other systems reviewed and are negative.     Physical Exam     Initial Vitals [11/22/24 1844]   BP Pulse Resp Temp SpO2   (!) 140/72 93 18 98.1 °F (36.7 °C) 100 %      MAP       --          Physical Exam  Nursing Notes and Vital Signs Reviewed.  Constitutional: Patient is in no acute distress. Well-developed and well-nourished.  Head: Atraumatic. Normocephalic.  Eyes: PERRL. EOM intact. Conjunctivae are not pale. No scleral icterus.  ENT: Mucous membranes are moist. Oropharynx is clear and symmetric.    Neck: Supple. Full ROM. No lymphadenopathy.  Cardiovascular: Regular rate. Regular rhythm. No murmurs, rubs, or gallops. Distal pulses are 2+ and symmetric.  Pulmonary/Chest: No respiratory distress. Clear to auscultation bilaterally. No wheezing or rales. Left interior chest wall tenderness  Abdominal: Soft and non-distended.  There is no tenderness.  No rebound, guarding, or rigidity. Good bowel sounds.  Genitourinary: No CVA tenderness  Musculoskeletal: Moves all extremities. No obvious deformities. No edema. No calf tenderness.  Skin:  Warm and dry.  Neurological:  Alert, awake, and appropriate.  Normal speech.  No acute focal neurological deficits are appreciated.  Psychiatric: Normal affect. Good eye contact. Appropriate in content.     ED Course   Procedures  ED Vital Signs:  Vitals:    11/22/24 1844 11/22/24 1900 11/22/24 1945 11/22/24 2036   BP: (!) 140/72 137/68 116/74    Pulse: 93 76 66 66   Resp: 18 16 19    Temp: 98.1 °F (36.7 °C)      TempSrc: Oral      SpO2: 100% 100% 100%     11/22/24 2148   BP: 131/74   Pulse: 70   Resp: 16   Temp: 98.4 °F (36.9 °C)   TempSrc: Oral   SpO2: 100%       Abnormal Lab Results:  Labs Reviewed   CBC W/ AUTO DIFFERENTIAL - Abnormal       Result Value    WBC 7.52      RBC 3.58 (*)     Hemoglobin 9.5 (*)     Hematocrit 29.4 (*)     MCV 82      MCH 26.5 (*)     MCHC 32.3      RDW 16.9 (*)     Platelets 317      MPV 9.4      Immature Granulocytes 0.3      Gran # (ANC) 3.9      Immature Grans (Abs) 0.02      Lymph # 2.7      Mono # 0.8      Eos # 0.1      Baso # 0.04      nRBC 0      Gran % 51.7      Lymph % 35.6      Mono % 10.4      Eosinophil % 1.5      Basophil % 0.5      Differential Method Automated      Narrative:     Release to patient->Immediate   COMPREHENSIVE METABOLIC PANEL - Abnormal    Sodium 137      Potassium 3.4 (*)     Chloride 102      CO2 25      Glucose 104      BUN 14      Creatinine 0.9      Calcium 9.0      Total Protein 7.0      Albumin 3.7      Total Bilirubin 0.2      Alkaline Phosphatase 60      AST 15      ALT 8 (*)     eGFR >60      Anion Gap 10      Narrative:     Release to patient->Immediate   URINALYSIS - Abnormal    Specimen UA Urine, Clean Catch      Color, UA Colorless (*)     Appearance, UA Clear      pH, UA 7.0      Specific Gravity, UA <1.005 (*)     Protein, UA Negative      Glucose, UA Negative      Ketones, UA Negative      Bilirubin (UA) Negative      Occult Blood UA Negative      Nitrite, UA Negative      Urobilinogen, UA Negative      Leukocytes, UA Negative      HEPATITIS C ANTIBODY    Hepatitis C Ab Negative      Narrative:     Release to patient->Immediate   HEP C VIRUS HOLD SPECIMEN    HEP C Virus Hold Specimen Hold for HCV sendout      Narrative:     Release to patient->Immediate   HIV 1 / 2 ANTIBODY    HIV 1/2 Ag/Ab Negative      Narrative:     Release to patient->Immediate   B-TYPE NATRIURETIC PEPTIDE    BNP 10      Narrative:     Release to patient->Immediate   TROPONIN I    Troponin I 0.021      Narrative:     Release to patient->Immediate   SEDIMENTATION RATE    Sed Rate 30     DRUG SCREEN PANEL, URINE EMERGENCY   PREGNANCY TEST, URINE RAPID   PREGNANCY TEST, URINE RAPID    Preg Test, Ur Negative     IRON AND TIBC   VITAMIN B12   FOLATE        All Lab Results:  Results for orders placed or performed during the hospital encounter of 11/22/24   Hepatitis C Antibody    Collection Time: 11/22/24  7:06 PM   Result Value Ref Range    Hepatitis C Ab Negative Negative   HCV Virus Hold Specimen    Collection Time: 11/22/24  7:06 PM   Result Value Ref Range    HEP C Virus Hold Specimen Hold for HCV sendout    HIV 1/2 Ag/Ab (4th Gen)    Collection Time: 11/22/24  7:06 PM   Result Value Ref Range    HIV 1/2 Ag/Ab Negative Negative   CBC auto differential    Collection Time: 11/22/24  7:06 PM   Result Value Ref Range    WBC 7.52 3.90 - 12.70 K/uL    RBC 3.58 (L) 4.00 - 5.40 M/uL    Hemoglobin 9.5 (L) 12.0 - 16.0 g/dL    Hematocrit 29.4 (L) 37.0 - 48.5 %    MCV 82 82 - 98 fL    MCH 26.5 (L) 27.0 - 31.0 pg    MCHC 32.3 32.0 - 36.0 g/dL    RDW 16.9 (H) 11.5 - 14.5 %    Platelets 317 150 - 450 K/uL    MPV 9.4 9.2 - 12.9 fL    Immature Granulocytes 0.3 0.0 - 0.5 %    Gran # (ANC) 3.9 1.8 - 7.7 K/uL    Immature Grans (Abs) 0.02 0.00 - 0.04 K/uL    Lymph # 2.7 1.0 - 4.8 K/uL    Mono # 0.8 0.3 - 1.0 K/uL    Eos # 0.1 0.0 - 0.5 K/uL    Baso # 0.04 0.00 - 0.20 K/uL    nRBC 0 0 /100 WBC    Gran % 51.7 38.0 - 73.0 %    Lymph % 35.6 18.0 - 48.0 %    Mono % 10.4 4.0 - 15.0 %    Eosinophil  % 1.5 0.0 - 8.0 %    Basophil % 0.5 0.0 - 1.9 %    Differential Method Automated    Comprehensive metabolic panel    Collection Time: 11/22/24  7:06 PM   Result Value Ref Range    Sodium 137 136 - 145 mmol/L    Potassium 3.4 (L) 3.5 - 5.1 mmol/L    Chloride 102 95 - 110 mmol/L    CO2 25 23 - 29 mmol/L    Glucose 104 70 - 110 mg/dL    BUN 14 6 - 20 mg/dL    Creatinine 0.9 0.5 - 1.4 mg/dL    Calcium 9.0 8.7 - 10.5 mg/dL    Total Protein 7.0 6.0 - 8.4 g/dL    Albumin 3.7 3.5 - 5.2 g/dL    Total Bilirubin 0.2 0.1 - 1.0 mg/dL    Alkaline Phosphatase 60 40 - 150 U/L    AST 15 10 - 40 U/L    ALT 8 (L) 10 - 44 U/L    eGFR >60 >60 mL/min/1.73 m^2    Anion Gap 10 8 - 16 mmol/L   B-Type natriuretic peptide (BNP)    Collection Time: 11/22/24  7:06 PM   Result Value Ref Range    BNP 10 0 - 99 pg/mL   Troponin I    Collection Time: 11/22/24  7:06 PM   Result Value Ref Range    Troponin I 0.021 0.000 - 0.026 ng/mL   Urinalysis - Clean Catch    Collection Time: 11/22/24  7:20 PM   Result Value Ref Range    Specimen UA Urine, Clean Catch     Color, UA Colorless (A) Yellow, Straw, Amparo    Appearance, UA Clear Clear    pH, UA 7.0 5.0 - 8.0    Specific Gravity, UA <1.005 (A) 1.005 - 1.030    Protein, UA Negative Negative    Glucose, UA Negative Negative    Ketones, UA Negative Negative    Bilirubin (UA) Negative Negative    Occult Blood UA Negative Negative    Nitrite, UA Negative Negative    Urobilinogen, UA Negative <2.0 EU/dL    Leukocytes, UA Negative Negative   Drug screen panel, in-house    Collection Time: 11/22/24  7:20 PM   Result Value Ref Range    Benzodiazepines Negative Negative    Methadone metabolites Negative Negative    Cocaine (Metab.) Negative Negative    Opiate Scrn, Ur Negative Negative    Barbiturate Screen, Ur Negative Negative    Amphetamine Screen, Ur Negative Negative    THC Presumptive Positive (A) Negative    Phencyclidine Negative Negative    Creatinine, Urine 22.4 15.0 - 325.0 mg/dL    Toxicology  Information SEE COMMENT    Pregnancy, urine rapid    Collection Time: 11/22/24  7:20 PM   Result Value Ref Range    Preg Test, Ur Negative    Sedimentation rate    Collection Time: 11/22/24  9:30 PM   Result Value Ref Range    Sed Rate 30 0 - 36 mm/Hr   Magnesium    Collection Time: 11/22/24  9:30 PM   Result Value Ref Range    Magnesium 2.1 1.6 - 2.6 mg/dL   Ferritin    Collection Time: 11/22/24  9:30 PM   Result Value Ref Range    Ferritin 6 (L) 20.0 - 300.0 ng/mL   Protime-INR    Collection Time: 11/22/24  9:30 PM   Result Value Ref Range    Prothrombin Time 11.2 9.0 - 12.5 sec    INR 1.0 0.8 - 1.2   Troponin I    Collection Time: 11/22/24  9:30 PM   Result Value Ref Range    Troponin I 0.006 0.000 - 0.026 ng/mL   CK    Collection Time: 11/22/24  9:30 PM   Result Value Ref Range     20 - 180 U/L         Imaging Results:  Imaging Results              CT Head Without Contrast (Final result)  Result time 11/22/24 20:21:16      Final result by Jacinda Diez MD (Timothy) (11/22/24 20:21:16)                   Impression:      No acute intracranial abnormality    All CT scans at this facility use dose modulation, iterative reconstructions, and/or weight base dosing when appropriate to reduce radiation dose to as low as reasonably achievable.      Electronically signed by: Jacinda Diez MD  Date:    11/22/2024  Time:    20:21               Narrative:    EXAMINATION:  CT HEAD WITHOUT CONTRAST    CLINICAL HISTORY:  Headache, new or worsening, neuro deficit (Age 19-49y);    TECHNIQUE:  Noncontrast images were obtained    COMPARISON:  None    FINDINGS:  No intracranial acute hemorrhage or acute focal brain parenchymal abnormality is identified.  Calvarium is intact.                                       X-Ray Chest 1 View (Final result)  Result time 11/22/24 20:04:29      Final result by Jacinda Diez MD (Timothy) (11/22/24 20:04:29)                   Impression:      Negative single view chest  x-ray.      Electronically signed by: Jacinda Diez MD  Date:    11/22/2024  Time:    20:04               Narrative:    EXAMINATION:  XR CHEST 1 VIEW    CLINICAL HISTORY:  Chest pain,    COMPARISON:  Chest, 09/26/2020    FINDINGS:  Heart size is normal. The lung fields are clear. No acute cardiopulmonary infiltrate.                                       The EKG was ordered, reviewed, and independently interpreted by the ED provider.  Interpretation time: 18:46  Rate: 88 BPM  Rhythm: normal sinus rhythm  Interpretation: Nonspecific ST abnormality. No STEMI.           The Emergency Provider reviewed the vital signs and test results, which are outlined above.     ED Discussion     8:00 PM: Dr. Kirk transfers care of patient to Dr. Morrow pending x-ray and CT results.    8:15 PM: Dr. Morrow re-evaluated pt and agrees with Dr. Kirk's assessment.     8:35 PM: Discussed case with Audrey West NP ([facility]). Dr. Machuca agrees with current care and management of pt and accepts admission.   Admitting Service: Hospital Medicine  Admitting Physician: Dr. Machuca  Admit to: obs med/tele     45yo F c/o left sided chest pain that radiates to her jaw and left arm which began pta. EKG: nsr, no stemi. trop: 0.021, hx htn, smokes abotu half ppd. no previous cardiac workup. HEART score: 4. ordered asa.  admission for chest pain, r/o MI.      Medical Decision Making  43 yo female c/o headache for past 2 days.  Today her  gave her a BC powder and then she started having left sided chest pain and tingling in her left arm.  She says this pain comes and goes and is 9/10.  She is a smoker. She has h/o migraine headaches. She has been seen by neurology for bilateral hand numbness/paresthesia.    Amount and/or Complexity of Data Reviewed  Independent Historian: spouse     Details:  at bedside   Labs: ordered. Decision-making details documented in ED Course.  Radiology: ordered and independent interpretation  performed. Decision-making details documented in ED Course.  ECG/medicine tests: ordered and independent interpretation performed. Decision-making details documented in ED Course.    Risk  OTC drugs.  Prescription drug management.  Parenteral controlled substances.  Decision regarding hospitalization.  Risk Details: OTC drugs, prescription drugs and controlled substances considered.  Due to patient's symptoms improving and pain controlled pain medications ordered appropriately.  DDX: MI, CAD, PUlmonary disease, PE, AAA, Pneumonia, Costochondritis, PTX, Liver disease         Additional MDM:   Heart Score:    History:          Moderately suspicious.  ECG:             Nonspecific repolarisation disturbance  Age:               45-65 years  Risk factors: 1-2 risk factors  Troponin:       Less than or equal to normal limit  Heart Score = 4                ED Medication(s):  Medications   sodium chloride 0.9% flush 10 mL (has no administration in time range)   naloxone 0.4 mg/mL injection 0.02 mg (has no administration in time range)   glucose chewable tablet 16 g (has no administration in time range)   glucose chewable tablet 24 g (has no administration in time range)   glucagon (human recombinant) injection 1 mg (has no administration in time range)   pantoprazole injection 40 mg (has no administration in time range)   dextrose 10% bolus 125 mL 125 mL (has no administration in time range)   dextrose 10% bolus 250 mL 250 mL (has no administration in time range)   acetaminophen tablet 650 mg (has no administration in time range)   HYDROcodone-acetaminophen 5-325 mg per tablet 1 tablet (has no administration in time range)   morphine injection 2 mg (has no administration in time range)   hydrALAZINE injection 10 mg (has no administration in time range)   ondansetron injection 4 mg (has no administration in time range)   butalbital-acetaminophen-caffeine -40 mg per tablet 1 tablet (1 tablet Oral Given 11/22/24 1935)    aspirin tablet 325 mg (325 mg Oral Given 11/22/24 2039)   potassium bicarbonate disintegrating tablet 40 mEq (40 mEq Oral Given 11/22/24 2241)       Current Discharge Medication List                  Scribe Attestation:   Scribe #1: I performed the above scribed service and the documentation accurately describes the services I performed. I attest to the accuracy of the note.     Attending:   Physician Attestation Statement for Scribe #1: I, Sarah Kirk MD, personally performed the services described in this documentation, as scribed by Rick Hayes, in my presence, and it is both accurate and complete.       Scribe Attestation:   Scribe #2: I performed the above scribed service and the documentation accurately describes the services I performed. I attest to the accuracy of the note.    Attending Attestation:           Physician Attestation for Scribe:    Physician Attestation Statement for Scribe #2: I, Sami Morrow Jr., MD, reviewed documentation, as scribed by Keisha Abbasi in my presence, and it is both accurate and complete. I also acknowledge and confirm the content of the note done by Scribe #1.           Clinical Impression       ICD-10-CM ICD-9-CM   1. Chest pain  R07.9 786.50       Disposition:   Disposition: Placed in Observation  Condition: Fair       Sami Morrow Jr., MD  11/22/24 2205       Sami Morrow Jr., MD  11/22/24 2311
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
## 2024-11-24 LAB
OHS QRS DURATION: 92 MS
OHS QRS DURATION: 94 MS
OHS QTC CALCULATION: 418 MS
OHS QTC CALCULATION: 474 MS

## 2024-11-24 NOTE — ASSESSMENT & PLAN NOTE
Atypical   Possibly due to gabapentin use   Stress test ordered and shows no ischemia   Continue blood pressure control
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
## 2024-11-24 NOTE — SUBJECTIVE & OBJECTIVE
Past Medical History:   Diagnosis Date    Hypertension        Past Surgical History:   Procedure Laterality Date     SECTION      x 3    TONSILLECTOMY         Review of patient's allergies indicates:  No Known Allergies    No current facility-administered medications on file prior to encounter.     Current Outpatient Medications on File Prior to Encounter   Medication Sig    [DISCONTINUED] amoxicillin (AMOXIL) 875 MG tablet Take 1 tablet (875 mg total) by mouth 2 (two) times daily.    [DISCONTINUED] lisinopriL 10 MG tablet Take 1 tablet (10 mg total) by mouth once daily.    [DISCONTINUED] ondansetron (ZOFRAN) 4 MG tablet Take 1 tablet (4 mg total) by mouth every 12 (twelve) hours as needed for Nausea.    [DISCONTINUED] ondansetron (ZOFRAN-ODT) 4 MG TbDL Take 1 tablet (4 mg total) by mouth every 6 (six) hours as needed.     Family History    None       Tobacco Use    Smoking status: Every Day     Current packs/day: 0.25     Types: Cigarettes    Smokeless tobacco: Never   Substance and Sexual Activity    Alcohol use: Yes     Comment: rarely    Drug use: No    Sexual activity: Not on file     ROS  Objective:     Vital Signs (Most Recent):  Temp: 98 °F (36.7 °C) (24 1153)  Pulse: 61 (24 1153)  Resp: 18 (24 1153)  BP: 129/88 (24 1333)  SpO2: 100 % (24 1153) Vital Signs (24h Range):  Temp:  [97.8 °F (36.6 °C)-98.4 °F (36.9 °C)] 98 °F (36.7 °C)  Pulse:  [] 61  Resp:  [15-19] 18  SpO2:  [95 %-100 %] 100 %  BP: (112-143)/(57-88) 129/88     Weight: 76.7 kg (169 lb)  Body mass index is 29.01 kg/m².    SpO2: 100 %         Intake/Output Summary (Last 24 hours) at 2024 1852  Last data filed at 2024 0834  Gross per 24 hour   Intake 480 ml   Output --   Net 480 ml       Lines/Drains/Airways       None                    Physical Exam  Vitals and nursing note reviewed.   Constitutional:       Appearance: Normal appearance. She is well-developed.   HENT:      Head:  Normocephalic.      Mouth/Throat:      Mouth: Mucous membranes are moist.   Neck:      Vascular: No carotid bruit or JVD.   Cardiovascular:      Rate and Rhythm: Normal rate and regular rhythm.      Pulses: Normal pulses.      Heart sounds: Normal heart sounds. No murmur heard.     No friction rub.   Pulmonary:      Effort: Pulmonary effort is normal. No respiratory distress.      Breath sounds: Normal breath sounds. No wheezing or rales.   Abdominal:      General: Bowel sounds are normal. There is no distension.      Palpations: Abdomen is soft.      Tenderness: There is no abdominal tenderness. There is no guarding.   Musculoskeletal:         General: No swelling or tenderness.      Cervical back: Neck supple. No tenderness.      Right lower leg: No edema.      Left lower leg: No edema.   Skin:     General: Skin is warm and dry.      Capillary Refill: Capillary refill takes less than 2 seconds.      Findings: No rash.   Neurological:      General: No focal deficit present.      Mental Status: She is alert and oriented to person, place, and time.   Psychiatric:         Mood and Affect: Mood normal.         Behavior: Behavior normal.         Thought Content: Thought content normal.          Significant Labs: BMP:   Recent Labs   Lab 11/22/24 1906 11/22/24 2130 11/23/24 0519     --  78     --  139   K 3.4*  --  4.1     --  104   CO2 25  --  27   BUN 14  --  12   CREATININE 0.9  --  0.8   CALCIUM 9.0  --  8.5*   MG  --  2.1  --    , CMP   Recent Labs   Lab 11/22/24 1906 11/23/24  0519    139   K 3.4* 4.1    104   CO2 25 27    78   BUN 14 12   CREATININE 0.9 0.8   CALCIUM 9.0 8.5*   PROT 7.0 6.6   ALBUMIN 3.7 3.5   BILITOT 0.2 0.3   ALKPHOS 60 56   AST 15 12   ALT 8* 8*   ANIONGAP 10 8   , CBC   Recent Labs   Lab 11/22/24 1906 11/23/24 0519   WBC 7.52 5.71   HGB 9.5* 9.3*   HCT 29.4* 29.4*    310   , INR   Recent Labs   Lab 11/22/24 2130   INR 1.0   , Lipid Panel    Recent Labs   Lab 11/23/24  0519   CHOL 136   HDL 49   LDLCALC 76.2   TRIG 54   CHOLHDL 36.0   , and Troponin   Recent Labs   Lab 11/22/24  1906 11/22/24  2130 11/23/24  0216   TROPONINI 0.021 0.006 <0.006       Significant Imaging: Echocardiogram: 2D echo with color flow doppler: No results found for this or any previous visit. and Transthoracic echo (TTE) complete (Cupid Only):   Results for orders placed or performed during the hospital encounter of 11/22/24   Echo   Result Value Ref Range    BSA 1.86 m2    LVOT stroke volume 74.4 cm3    LVIDd 4.7 3.5 - 6.0 cm    LV Systolic Volume 36.87 mL    LV Systolic Volume Index 20.3 mL/m2    LVIDs 3.1 2.1 - 4.0 cm    LV Diastolic Volume 104.19 mL    LV Diastolic Volume Index 57.25 mL/m2    Left Ventricular End Systolic Volume by Teichholz Method 36.87 mL    Left Ventricular End Diastolic Volume by Teichholz Method 104.19 mL    IVS 0.9 0.6 - 1.1 cm    LVOT diameter 2.0 cm    LVOT area 3.1 cm2    FS 34.0 28 - 44 %    Left Ventricle Relative Wall Thickness 0.34 cm    PW 0.8 0.6 - 1.1 cm    LV mass 132.3 g    LV Mass Index 72.7 g/m2    MV Peak E Anthony 0.96 m/s    TDI LATERAL 0.15 m/s    TDI SEPTAL 0.15 m/s    E/E' ratio 6.40 m/s    MV Peak A Anthony 0.75 m/s    TR Max Anthony 2.07 m/s    E/A ratio 1.28     IVRT 64.70 msec    E wave deceleration time 217.29 msec    LV SEPTAL E/E' RATIO 6.40 m/s    LV LATERAL E/E' RATIO 6.40 m/s    LVOT peak anthony 1.1 m/s    Left Ventricular Outflow Tract Mean Velocity 0.84 cm/s    Left Ventricular Outflow Tract Mean Gradient 2.97 mmHg    RVOT peak VTI 16.6 cm    TAPSE 2.40 cm    LA size 3.12 cm    Left Atrium Minor Axis 4.92 cm    Left Atrium Major Axis 4.65 cm    RA Major Axis 3.86 cm    AV mean gradient 4.6 mmHg    AV peak gradient 7.8 mmHg    Ao peak anthony 1.4 m/s    Ao VTI 33.1 cm    LVOT peak VTI 23.7 cm    AV valve area 2.2 cm²    AV Velocity Ratio 0.79     AV index (prosthetic) 0.72     MARILEE by Velocity Ratio 2.5 cm²    MV stenosis pressure 1/2 time  63.01 ms    MV valve area p 1/2 method 3.49 cm2    Triscuspid Valve Regurgitation Peak Gradient 17 mmHg    PV mean gradient 2 mmHg    RVOT peak manju 0.79 m/s    Ao root annulus 3.09 cm    STJ 3.23 cm    Ascending aorta 3.16 cm    IVC diameter 1.72 cm    Mean e' 0.15 m/s    ZLVIDS -0.03     ZLVIDD -0.70     MAGNOLIA 22.3 mL/m2    LA Vol 40.58 cm3    LA WIDTH 3.2 cm    RA Width 2.5 cm    EF 65 %    TV resting pulmonary artery pressure 20 mmHg    RV TB RVSP 5 mmHg    Est. RA pres 3 mmHg    Narrative      Left Ventricle: The left ventricle is normal in size. Normal wall   thickness. Normal wall motion. There is normal systolic function. Ejection   fraction is approximately 65%. There is normal diastolic function.    Right Ventricle: Normal right ventricular cavity size. Right ventricle   wall motion  is normal. Systolic function is normal.    Pulmonary Artery: The estimated pulmonary artery systolic pressure is   20 mmHg.    IVC/SVC: Normal venous pressure at 3 mmHg.
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
## 2024-11-26 NOTE — HOSPITAL COURSE
Admitted to Hospital Medicine for evaluation of chest pain.  Cardiology consulted.  TTE with normal wall motion, normal systolic function, normal diastolic function.  Cardiac nuclear stress test was normal.  Per cardiac recommendations, referrals provided for outpatient sleep study with patient counseled to follow up on referral.  Started on B12 and iron supplementation given concerns for deficiency on lab work.  Throughout hospitalization course, patient remained afebrile, hemodynamically stable and reported resolution of her presenting symptoms.    Discharge plan of care was discussed at length with patient including patient's need for close outpatient follow-up. Outpatient follow-ups were scheduled, or if unable to be scheduled ambulatory referrals were placed. Counseled on Cardiology, sleep medicine follow up. Counseled on PCP follow up within 1 week with repeat lab work to ensure normalization, follow up of pending labs, or any further evaluation as necessitated. Counseled on frequent home BP monitoring. All questions and concerns were answered. Return precautions provided.  Patient instructed to return to the hospital or seek medical care if baseline status should suddenly change, return of symptoms occurs, or for any new or concerning symptoms that arise.  Patient was in agreement with plan of care going forward. Patient seen and examined on the day of discharge. Patient deemed stable for discharge.